# Patient Record
Sex: FEMALE | Race: WHITE | NOT HISPANIC OR LATINO | Employment: FULL TIME | URBAN - METROPOLITAN AREA
[De-identification: names, ages, dates, MRNs, and addresses within clinical notes are randomized per-mention and may not be internally consistent; named-entity substitution may affect disease eponyms.]

---

## 2024-11-22 ENCOUNTER — APPOINTMENT (OUTPATIENT)
Dept: RADIOLOGY | Facility: HOSPITAL | Age: 69
End: 2024-11-22
Payer: COMMERCIAL

## 2024-11-22 ENCOUNTER — HOSPITAL ENCOUNTER (OUTPATIENT)
Facility: HOSPITAL | Age: 69
Setting detail: OBSERVATION
End: 2024-11-22
Attending: EMERGENCY MEDICINE | Admitting: INTERNAL MEDICINE
Payer: COMMERCIAL

## 2024-11-22 DIAGNOSIS — Z74.09 IMPAIRED MOBILITY AND ADLS: ICD-10-CM

## 2024-11-22 DIAGNOSIS — K59.03 DRUG-INDUCED CONSTIPATION: ICD-10-CM

## 2024-11-22 DIAGNOSIS — M54.32 LEFT SIDED SCIATICA: ICD-10-CM

## 2024-11-22 DIAGNOSIS — Z78.9 IMPAIRED MOBILITY AND ADLS: ICD-10-CM

## 2024-11-22 DIAGNOSIS — K21.9 GASTROESOPHAGEAL REFLUX DISEASE WITHOUT ESOPHAGITIS: ICD-10-CM

## 2024-11-22 DIAGNOSIS — M48.00 CENTRAL STENOSIS OF SPINAL CANAL: ICD-10-CM

## 2024-11-22 DIAGNOSIS — M54.42 ACUTE LEFT-SIDED LOW BACK PAIN WITH LEFT-SIDED SCIATICA: Primary | ICD-10-CM

## 2024-11-22 LAB
ALBUMIN SERPL BCP-MCNC: 3.9 G/DL (ref 3.4–5)
ALP SERPL-CCNC: 70 U/L (ref 33–136)
ALT SERPL W P-5'-P-CCNC: 71 U/L (ref 7–45)
ANION GAP SERPL CALC-SCNC: 13 MMOL/L (ref 10–20)
AST SERPL W P-5'-P-CCNC: 52 U/L (ref 9–39)
BASOPHILS # BLD AUTO: 0.05 X10*3/UL (ref 0–0.1)
BASOPHILS NFR BLD AUTO: 0.6 %
BILIRUB SERPL-MCNC: 0.6 MG/DL (ref 0–1.2)
BUN SERPL-MCNC: 17 MG/DL (ref 6–23)
CALCIUM SERPL-MCNC: 8.9 MG/DL (ref 8.6–10.3)
CHLORIDE SERPL-SCNC: 109 MMOL/L (ref 98–107)
CO2 SERPL-SCNC: 27 MMOL/L (ref 21–32)
CREAT SERPL-MCNC: 0.78 MG/DL (ref 0.5–1.05)
EGFRCR SERPLBLD CKD-EPI 2021: 82 ML/MIN/1.73M*2
EOSINOPHIL # BLD AUTO: 0.22 X10*3/UL (ref 0–0.7)
EOSINOPHIL NFR BLD AUTO: 2.6 %
ERYTHROCYTE [DISTWIDTH] IN BLOOD BY AUTOMATED COUNT: 12.4 % (ref 11.5–14.5)
GLUCOSE SERPL-MCNC: 88 MG/DL (ref 74–99)
HCT VFR BLD AUTO: 43.8 % (ref 36–46)
HGB BLD-MCNC: 14.9 G/DL (ref 12–16)
IMM GRANULOCYTES # BLD AUTO: 0.03 X10*3/UL (ref 0–0.7)
IMM GRANULOCYTES NFR BLD AUTO: 0.4 % (ref 0–0.9)
LYMPHOCYTES # BLD AUTO: 1.81 X10*3/UL (ref 1.2–4.8)
LYMPHOCYTES NFR BLD AUTO: 21.8 %
MCH RBC QN AUTO: 30 PG (ref 26–34)
MCHC RBC AUTO-ENTMCNC: 34 G/DL (ref 32–36)
MCV RBC AUTO: 88 FL (ref 80–100)
MONOCYTES # BLD AUTO: 0.63 X10*3/UL (ref 0.1–1)
MONOCYTES NFR BLD AUTO: 7.6 %
NEUTROPHILS # BLD AUTO: 5.58 X10*3/UL (ref 1.2–7.7)
NEUTROPHILS NFR BLD AUTO: 67 %
NRBC BLD-RTO: 0 /100 WBCS (ref 0–0)
PLATELET # BLD AUTO: 209 X10*3/UL (ref 150–450)
POTASSIUM SERPL-SCNC: 3.5 MMOL/L (ref 3.5–5.3)
PROT SERPL-MCNC: 6.7 G/DL (ref 6.4–8.2)
RBC # BLD AUTO: 4.96 X10*6/UL (ref 4–5.2)
SODIUM SERPL-SCNC: 145 MMOL/L (ref 136–145)
WBC # BLD AUTO: 8.3 X10*3/UL (ref 4.4–11.3)

## 2024-11-22 PROCEDURE — 96375 TX/PRO/DX INJ NEW DRUG ADDON: CPT

## 2024-11-22 PROCEDURE — 96372 THER/PROPH/DIAG INJ SC/IM: CPT | Mod: 59 | Performed by: INTERNAL MEDICINE

## 2024-11-22 PROCEDURE — 2500000005 HC RX 250 GENERAL PHARMACY W/O HCPCS

## 2024-11-22 PROCEDURE — 96374 THER/PROPH/DIAG INJ IV PUSH: CPT

## 2024-11-22 PROCEDURE — 72131 CT LUMBAR SPINE W/O DYE: CPT

## 2024-11-22 PROCEDURE — G0378 HOSPITAL OBSERVATION PER HR: HCPCS

## 2024-11-22 PROCEDURE — 2500000001 HC RX 250 WO HCPCS SELF ADMINISTERED DRUGS (ALT 637 FOR MEDICARE OP)

## 2024-11-22 PROCEDURE — 36415 COLL VENOUS BLD VENIPUNCTURE: CPT

## 2024-11-22 PROCEDURE — 99222 1ST HOSP IP/OBS MODERATE 55: CPT | Performed by: INTERNAL MEDICINE

## 2024-11-22 PROCEDURE — 72131 CT LUMBAR SPINE W/O DYE: CPT | Performed by: RADIOLOGY

## 2024-11-22 PROCEDURE — 2500000001 HC RX 250 WO HCPCS SELF ADMINISTERED DRUGS (ALT 637 FOR MEDICARE OP): Performed by: INTERNAL MEDICINE

## 2024-11-22 PROCEDURE — 84075 ASSAY ALKALINE PHOSPHATASE: CPT

## 2024-11-22 PROCEDURE — 72148 MRI LUMBAR SPINE W/O DYE: CPT | Performed by: RADIOLOGY

## 2024-11-22 PROCEDURE — 2500000004 HC RX 250 GENERAL PHARMACY W/ HCPCS (ALT 636 FOR OP/ED): Performed by: INTERNAL MEDICINE

## 2024-11-22 PROCEDURE — 72148 MRI LUMBAR SPINE W/O DYE: CPT

## 2024-11-22 PROCEDURE — 2500000004 HC RX 250 GENERAL PHARMACY W/ HCPCS (ALT 636 FOR OP/ED)

## 2024-11-22 PROCEDURE — 85025 COMPLETE CBC W/AUTO DIFF WBC: CPT

## 2024-11-22 PROCEDURE — 99285 EMERGENCY DEPT VISIT HI MDM: CPT | Mod: 25

## 2024-11-22 RX ORDER — BACLOFEN 10 MG/1
10 TABLET ORAL 2 TIMES DAILY
COMMUNITY
Start: 2024-11-19

## 2024-11-22 RX ORDER — GABAPENTIN 300 MG/1
300 CAPSULE ORAL ONCE
Status: COMPLETED | OUTPATIENT
Start: 2024-11-22 | End: 2024-11-22

## 2024-11-22 RX ORDER — KETOROLAC TROMETHAMINE 15 MG/ML
15 INJECTION, SOLUTION INTRAMUSCULAR; INTRAVENOUS ONCE
Status: COMPLETED | OUTPATIENT
Start: 2024-11-22 | End: 2024-11-22

## 2024-11-22 RX ORDER — OXYCODONE HYDROCHLORIDE 5 MG/1
5 TABLET ORAL ONCE
Status: COMPLETED | OUTPATIENT
Start: 2024-11-22 | End: 2024-11-22

## 2024-11-22 RX ORDER — VALSARTAN AND HYDROCHLOROTHIAZIDE 80; 12.5 MG/1; MG/1
1 TABLET, FILM COATED ORAL
COMMUNITY
Start: 2024-08-19

## 2024-11-22 RX ORDER — VERAPAMIL HYDROCHLORIDE 180 MG/1
180 TABLET, EXTENDED RELEASE ORAL DAILY
Status: DISCONTINUED | OUTPATIENT
Start: 2024-11-23 | End: 2024-11-27 | Stop reason: HOSPADM

## 2024-11-22 RX ORDER — FLUTICASONE PROPIONATE AND SALMETEROL XINAFOATE 45; 21 UG/1; UG/1
2 AEROSOL, METERED RESPIRATORY (INHALATION) DAILY
COMMUNITY
Start: 2024-10-19

## 2024-11-22 RX ORDER — IBUPROFEN 400 MG/1
400 TABLET ORAL EVERY 6 HOURS PRN
COMMUNITY
End: 2024-11-27 | Stop reason: HOSPADM

## 2024-11-22 RX ORDER — HYDROCHLOROTHIAZIDE 25 MG/1
12.5 TABLET ORAL DAILY
COMMUNITY
Start: 2024-10-09 | End: 2024-11-27 | Stop reason: HOSPADM

## 2024-11-22 RX ORDER — PREDNISONE 10 MG/1
10 TABLET ORAL DAILY
Status: DISCONTINUED | OUTPATIENT
Start: 2024-11-29 | End: 2024-11-27 | Stop reason: HOSPADM

## 2024-11-22 RX ORDER — MELOXICAM 15 MG/1
15 TABLET ORAL
COMMUNITY
Start: 2024-10-19

## 2024-11-22 RX ORDER — ALBUTEROL SULFATE 90 UG/1
2 INHALANT RESPIRATORY (INHALATION) EVERY 6 HOURS PRN
COMMUNITY
Start: 2023-04-18

## 2024-11-22 RX ORDER — BACLOFEN 10 MG/1
10 TABLET ORAL 2 TIMES DAILY
Status: DISCONTINUED | OUTPATIENT
Start: 2024-11-22 | End: 2024-11-23

## 2024-11-22 RX ORDER — DEXAMETHASONE SODIUM PHOSPHATE 10 MG/ML
10 INJECTION INTRAMUSCULAR; INTRAVENOUS ONCE
Status: COMPLETED | OUTPATIENT
Start: 2024-11-22 | End: 2024-11-22

## 2024-11-22 RX ORDER — CYCLOBENZAPRINE HCL 10 MG
5 TABLET ORAL ONCE
Status: DISCONTINUED | OUTPATIENT
Start: 2024-11-22 | End: 2024-11-22

## 2024-11-22 RX ORDER — MORPHINE SULFATE 2 MG/ML
2 INJECTION, SOLUTION INTRAMUSCULAR; INTRAVENOUS EVERY 4 HOURS PRN
Status: DISCONTINUED | OUTPATIENT
Start: 2024-11-22 | End: 2024-11-23

## 2024-11-22 RX ORDER — HYDROCHLOROTHIAZIDE 25 MG/1
12.5 TABLET ORAL DAILY
Status: DISCONTINUED | OUTPATIENT
Start: 2024-11-23 | End: 2024-11-27 | Stop reason: HOSPADM

## 2024-11-22 RX ORDER — KETOROLAC TROMETHAMINE 10 MG/1
10 TABLET, FILM COATED ORAL EVERY 8 HOURS PRN
COMMUNITY
Start: 2014-05-19 | End: 2024-11-27 | Stop reason: HOSPADM

## 2024-11-22 RX ORDER — POLYETHYLENE GLYCOL 3350 17 G/17G
17 POWDER, FOR SOLUTION ORAL DAILY PRN
Status: DISCONTINUED | OUTPATIENT
Start: 2024-11-22 | End: 2024-11-27 | Stop reason: HOSPADM

## 2024-11-22 RX ORDER — ENOXAPARIN SODIUM 100 MG/ML
40 INJECTION SUBCUTANEOUS EVERY 12 HOURS SCHEDULED
Status: DISCONTINUED | OUTPATIENT
Start: 2024-11-22 | End: 2024-11-27 | Stop reason: HOSPADM

## 2024-11-22 RX ORDER — FLUTICASONE FUROATE AND VILANTEROL 100; 25 UG/1; UG/1
1 POWDER RESPIRATORY (INHALATION)
Status: DISCONTINUED | OUTPATIENT
Start: 2024-11-23 | End: 2024-11-24 | Stop reason: ALTCHOICE

## 2024-11-22 RX ORDER — SERTRALINE HYDROCHLORIDE 100 MG/1
100 TABLET, FILM COATED ORAL DAILY
COMMUNITY

## 2024-11-22 RX ORDER — MULTIVITAMIN
1 TABLET ORAL DAILY
COMMUNITY

## 2024-11-22 RX ORDER — ACETAMINOPHEN 325 MG/1
650 TABLET ORAL EVERY 6 HOURS PRN
Status: DISCONTINUED | OUTPATIENT
Start: 2024-11-22 | End: 2024-11-23

## 2024-11-22 RX ORDER — LIDOCAINE 560 MG/1
1 PATCH PERCUTANEOUS; TOPICAL; TRANSDERMAL ONCE
Status: COMPLETED | OUTPATIENT
Start: 2024-11-22 | End: 2024-11-22

## 2024-11-22 RX ORDER — VIT C/E/ZN/COPPR/LUTEIN/ZEAXAN 250MG-90MG
25 CAPSULE ORAL DAILY
COMMUNITY

## 2024-11-22 RX ORDER — MELOXICAM 7.5 MG/1
15 TABLET ORAL
Status: DISCONTINUED | OUTPATIENT
Start: 2024-11-23 | End: 2024-11-27 | Stop reason: HOSPADM

## 2024-11-22 RX ORDER — SERTRALINE HYDROCHLORIDE 50 MG/1
100 TABLET, FILM COATED ORAL DAILY
Status: DISCONTINUED | OUTPATIENT
Start: 2024-11-23 | End: 2024-11-27 | Stop reason: HOSPADM

## 2024-11-22 RX ORDER — PREDNISONE 20 MG/1
20 TABLET ORAL DAILY
Status: COMPLETED | OUTPATIENT
Start: 2024-11-23 | End: 2024-11-25

## 2024-11-22 RX ORDER — GABAPENTIN 300 MG/1
300 CAPSULE ORAL 2 TIMES DAILY
Status: DISCONTINUED | OUTPATIENT
Start: 2024-11-23 | End: 2024-11-23

## 2024-11-22 RX ORDER — PREDNISONE 5 MG/1
5 TABLET ORAL DAILY
Status: DISCONTINUED | OUTPATIENT
Start: 2024-12-02 | End: 2024-11-27 | Stop reason: HOSPADM

## 2024-11-22 RX ORDER — CHOLECALCIFEROL (VITAMIN D3) 25 MCG
1000 TABLET ORAL DAILY
Status: DISCONTINUED | OUTPATIENT
Start: 2024-11-23 | End: 2024-11-27 | Stop reason: HOSPADM

## 2024-11-22 RX ORDER — ACETAMINOPHEN 325 MG/1
650 TABLET ORAL EVERY 6 HOURS PRN
COMMUNITY

## 2024-11-22 RX ORDER — NALOXONE HYDROCHLORIDE 0.4 MG/ML
0.2 INJECTION, SOLUTION INTRAMUSCULAR; INTRAVENOUS; SUBCUTANEOUS EVERY 5 MIN PRN
Status: DISCONTINUED | OUTPATIENT
Start: 2024-11-22 | End: 2024-11-27 | Stop reason: HOSPADM

## 2024-11-22 RX ORDER — LORAZEPAM 1 MG/1
1 TABLET ORAL ONCE
Status: COMPLETED | OUTPATIENT
Start: 2024-11-22 | End: 2024-11-22

## 2024-11-22 RX ORDER — ORPHENADRINE CITRATE 30 MG/ML
60 INJECTION INTRAMUSCULAR; INTRAVENOUS ONCE
Status: COMPLETED | OUTPATIENT
Start: 2024-11-22 | End: 2024-11-22

## 2024-11-22 RX ORDER — GABAPENTIN 300 MG/1
300 CAPSULE ORAL 2 TIMES DAILY
COMMUNITY
Start: 2024-11-20 | End: 2024-12-05

## 2024-11-22 RX ORDER — VERAPAMIL HYDROCHLORIDE 180 MG/1
1 CAPSULE, EXTENDED RELEASE ORAL DAILY
COMMUNITY
Start: 2024-06-17

## 2024-11-22 RX ORDER — ALBUTEROL SULFATE 90 UG/1
2 INHALANT RESPIRATORY (INHALATION) EVERY 6 HOURS PRN
Status: DISCONTINUED | OUTPATIENT
Start: 2024-11-22 | End: 2024-11-23

## 2024-11-22 RX ORDER — MORPHINE SULFATE 2 MG/ML
1 INJECTION, SOLUTION INTRAMUSCULAR; INTRAVENOUS EVERY 4 HOURS PRN
Status: DISCONTINUED | OUTPATIENT
Start: 2024-11-22 | End: 2024-11-23

## 2024-11-22 SDOH — ECONOMIC STABILITY: FOOD INSECURITY: WITHIN THE PAST 12 MONTHS, YOU WORRIED THAT YOUR FOOD WOULD RUN OUT BEFORE YOU GOT THE MONEY TO BUY MORE.: NEVER TRUE

## 2024-11-22 SDOH — SOCIAL STABILITY: SOCIAL INSECURITY: WERE YOU ABLE TO COMPLETE ALL THE BEHAVIORAL HEALTH SCREENINGS?: YES

## 2024-11-22 SDOH — SOCIAL STABILITY: SOCIAL INSECURITY: WITHIN THE LAST YEAR, HAVE YOU BEEN AFRAID OF YOUR PARTNER OR EX-PARTNER?: NO

## 2024-11-22 SDOH — SOCIAL STABILITY: SOCIAL INSECURITY
WITHIN THE LAST YEAR, HAVE YOU BEEN KICKED, HIT, SLAPPED, OR OTHERWISE PHYSICALLY HURT BY YOUR PARTNER OR EX-PARTNER?: NO

## 2024-11-22 SDOH — SOCIAL STABILITY: SOCIAL INSECURITY
WITHIN THE LAST YEAR, HAVE YOU BEEN RAPED OR FORCED TO HAVE ANY KIND OF SEXUAL ACTIVITY BY YOUR PARTNER OR EX-PARTNER?: NO

## 2024-11-22 SDOH — ECONOMIC STABILITY: FOOD INSECURITY: WITHIN THE PAST 12 MONTHS, THE FOOD YOU BOUGHT JUST DIDN'T LAST AND YOU DIDN'T HAVE MONEY TO GET MORE.: NEVER TRUE

## 2024-11-22 SDOH — ECONOMIC STABILITY: INCOME INSECURITY: IN THE PAST 12 MONTHS HAS THE ELECTRIC, GAS, OIL, OR WATER COMPANY THREATENED TO SHUT OFF SERVICES IN YOUR HOME?: NO

## 2024-11-22 SDOH — SOCIAL STABILITY: SOCIAL INSECURITY: DOES ANYONE TRY TO KEEP YOU FROM HAVING/CONTACTING OTHER FRIENDS OR DOING THINGS OUTSIDE YOUR HOME?: NO

## 2024-11-22 SDOH — SOCIAL STABILITY: SOCIAL INSECURITY: HAVE YOU HAD THOUGHTS OF HARMING ANYONE ELSE?: NO

## 2024-11-22 SDOH — SOCIAL STABILITY: SOCIAL INSECURITY: ARE YOU OR HAVE YOU BEEN THREATENED OR ABUSED PHYSICALLY, EMOTIONALLY, OR SEXUALLY BY ANYONE?: NO

## 2024-11-22 SDOH — SOCIAL STABILITY: SOCIAL INSECURITY: HAVE YOU HAD ANY THOUGHTS OF HARMING ANYONE ELSE?: NO

## 2024-11-22 SDOH — SOCIAL STABILITY: SOCIAL INSECURITY: DO YOU FEEL ANYONE HAS EXPLOITED OR TAKEN ADVANTAGE OF YOU FINANCIALLY OR OF YOUR PERSONAL PROPERTY?: NO

## 2024-11-22 SDOH — SOCIAL STABILITY: SOCIAL INSECURITY: WITHIN THE LAST YEAR, HAVE YOU BEEN HUMILIATED OR EMOTIONALLY ABUSED IN OTHER WAYS BY YOUR PARTNER OR EX-PARTNER?: NO

## 2024-11-22 SDOH — SOCIAL STABILITY: SOCIAL INSECURITY: DO YOU FEEL UNSAFE GOING BACK TO THE PLACE WHERE YOU ARE LIVING?: NO

## 2024-11-22 SDOH — SOCIAL STABILITY: SOCIAL INSECURITY: HAS ANYONE EVER THREATENED TO HURT YOUR FAMILY OR YOUR PETS?: NO

## 2024-11-22 SDOH — SOCIAL STABILITY: SOCIAL INSECURITY: ARE THERE ANY APPARENT SIGNS OF INJURIES/BEHAVIORS THAT COULD BE RELATED TO ABUSE/NEGLECT?: NO

## 2024-11-22 SDOH — SOCIAL STABILITY: SOCIAL INSECURITY: ABUSE: ADULT

## 2024-11-22 ASSESSMENT — LIFESTYLE VARIABLES
HOW OFTEN DO YOU HAVE 6 OR MORE DRINKS ON ONE OCCASION: NEVER
HAVE YOU EVER FELT YOU SHOULD CUT DOWN ON YOUR DRINKING: NO
HOW OFTEN DO YOU HAVE A DRINK CONTAINING ALCOHOL: MONTHLY OR LESS
EVER FELT BAD OR GUILTY ABOUT YOUR DRINKING: NO
TOTAL SCORE: 0
HOW MANY STANDARD DRINKS CONTAINING ALCOHOL DO YOU HAVE ON A TYPICAL DAY: 1 OR 2
EVER HAD A DRINK FIRST THING IN THE MORNING TO STEADY YOUR NERVES TO GET RID OF A HANGOVER: NO
AUDIT-C TOTAL SCORE: 1
HAVE PEOPLE ANNOYED YOU BY CRITICIZING YOUR DRINKING: NO
SKIP TO QUESTIONS 9-10: 1
AUDIT-C TOTAL SCORE: 1

## 2024-11-22 ASSESSMENT — COGNITIVE AND FUNCTIONAL STATUS - GENERAL
MOVING TO AND FROM BED TO CHAIR: A LOT
MOBILITY SCORE: 15
DRESSING REGULAR LOWER BODY CLOTHING: A LITTLE
DRESSING REGULAR UPPER BODY CLOTHING: A LITTLE
CLIMB 3 TO 5 STEPS WITH RAILING: A LOT
TURNING FROM BACK TO SIDE WHILE IN FLAT BAD: A LITTLE
WALKING IN HOSPITAL ROOM: A LOT
HELP NEEDED FOR BATHING: A LITTLE
TOILETING: A LITTLE
STANDING UP FROM CHAIR USING ARMS: A LOT
PATIENT BASELINE BEDBOUND: NO
DAILY ACTIVITIY SCORE: 20

## 2024-11-22 ASSESSMENT — PAIN - FUNCTIONAL ASSESSMENT
PAIN_FUNCTIONAL_ASSESSMENT: 0-10
PAIN_FUNCTIONAL_ASSESSMENT: 0-10

## 2024-11-22 ASSESSMENT — PATIENT HEALTH QUESTIONNAIRE - PHQ9
SUM OF ALL RESPONSES TO PHQ9 QUESTIONS 1 & 2: 0
2. FEELING DOWN, DEPRESSED OR HOPELESS: NOT AT ALL
1. LITTLE INTEREST OR PLEASURE IN DOING THINGS: NOT AT ALL

## 2024-11-22 ASSESSMENT — ACTIVITIES OF DAILY LIVING (ADL)
HEARING - LEFT EAR: FUNCTIONAL
ASSISTIVE_DEVICE: EYEGLASSES;WALKER
LACK_OF_TRANSPORTATION: NO
DRESSING YOURSELF: NEEDS ASSISTANCE
WALKS IN HOME: NEEDS ASSISTANCE
ADEQUATE_TO_COMPLETE_ADL: YES
GROOMING: NEEDS ASSISTANCE
FEEDING YOURSELF: INDEPENDENT
JUDGMENT_ADEQUATE_SAFELY_COMPLETE_DAILY_ACTIVITIES: YES
TOILETING: NEEDS ASSISTANCE
HEARING - RIGHT EAR: FUNCTIONAL
BATHING: NEEDS ASSISTANCE
PATIENT'S MEMORY ADEQUATE TO SAFELY COMPLETE DAILY ACTIVITIES?: YES

## 2024-11-22 ASSESSMENT — COLUMBIA-SUICIDE SEVERITY RATING SCALE - C-SSRS
6. HAVE YOU EVER DONE ANYTHING, STARTED TO DO ANYTHING, OR PREPARED TO DO ANYTHING TO END YOUR LIFE?: NO
2. HAVE YOU ACTUALLY HAD ANY THOUGHTS OF KILLING YOURSELF?: NO
1. IN THE PAST MONTH, HAVE YOU WISHED YOU WERE DEAD OR WISHED YOU COULD GO TO SLEEP AND NOT WAKE UP?: NO
6. HAVE YOU EVER DONE ANYTHING, STARTED TO DO ANYTHING, OR PREPARED TO DO ANYTHING TO END YOUR LIFE?: NO
1. IN THE PAST MONTH, HAVE YOU WISHED YOU WERE DEAD OR WISHED YOU COULD GO TO SLEEP AND NOT WAKE UP?: NO

## 2024-11-22 ASSESSMENT — PAIN SCALES - GENERAL
PAINLEVEL_OUTOF10: 7
PAINLEVEL_OUTOF10: 0 - NO PAIN
PAINLEVEL_OUTOF10: 0 - NO PAIN

## 2024-11-22 ASSESSMENT — PAIN DESCRIPTION - LOCATION: LOCATION: ABDOMEN

## 2024-11-22 NOTE — ED PROVIDER NOTES
CC: Back Pain     History provided by: Patient and Family Member  Limitations to History: None    HPI:  Patient is a 69-year-old female with history of lower back pain.  Patient states she has a history of a herniated disc in her lumbar spine and earlier this week she bent down to pick something up from the ground and since then has had progressively worsening lower back pain.  She states she called her PCP and was given diclofenac and gabapentin to take 3 days ago which has not been improving her symptoms.  She states this morning she felt twice when getting off of the toilet, did not hit her head, not on blood thinners, fell onto her bottom during these events.  She denies any worsening pain since the falls.  She states most of the pain is in the left side of her back and shoots into her left lower extremity up to the knee.  She denies any saddle anesthesia, urinary retention, urinary incontinence, bowel bladder changes, abdominal pain, syncopal episodes, fevers, chills, history of malignancy, weakness, numbness, tingling.  She states she has difficulty walking now due to pain.  She denies any chest pain, shortness of breath, prodrome prior to the fall.    External Records Reviewed:  I reviewed prior ED visits, Care Everywhere, discharge summaries and outpatient records as appropriate.   ???????????????????????????????????????????????????????????????  Triage Vitals:  T 36.5 °C (97.7 °F)  HR 62  /78  RR 18  O2 98 %      Physical Exam  Vitals and nursing note reviewed.   Constitutional:       General: She is not in acute distress.     Appearance: Normal appearance.   HENT:      Head: Normocephalic and atraumatic.   Eyes:      Conjunctiva/sclera: Conjunctivae normal.   Cardiovascular:      Rate and Rhythm: Normal rate and regular rhythm.   Pulmonary:      Effort: Pulmonary effort is normal. No respiratory distress.      Breath sounds: Normal breath sounds.   Abdominal:      General: Abdomen is flat. There  is no distension.      Palpations: Abdomen is soft.      Tenderness: There is no abdominal tenderness. There is no guarding or rebound.   Musculoskeletal:      Cervical back: Normal range of motion and neck supple.      Comments: No midline C, T, L-spine tenderness to palpation, no appreciable step-offs, 5 out of 5 strength in bilateral right lower extremity, patient is unable to lift left lower extremity off of the bed, is able to flex at the knee, negative logroll bilaterally, sensation grossly intact and symmetric in both lower extremities, no saddle anesthesia, 4-5 strength in left foot with dorsiflexion compared to right lower extremity, overall limited range of motion of left lower extremity   Skin:     General: Skin is warm and dry.   Neurological:      General: No focal deficit present.      Mental Status: She is alert and oriented to person, place, and time. Mental status is at baseline.   Psychiatric:         Mood and Affect: Mood normal.         Behavior: Behavior normal.        ???????????????????????????????????????????????????????????????  ED Course/Treatment/Medical Decision Making  MDM:  Patient is a 69-year-old female who presents with lower back pain.  Vital signs are stable.  No obvious traumatic injury on examination.  She has no midline spinal tenderness to palpation, decreased strength and movement in left lower extremity likely secondary to pain.  I did obtain CT of lumbar spine to further evaluate for spinal stenosis.  Differential diagnoses considered include radiculopathy, myelopathy, cord compression, cauda equina, spinal infection, osteomyelitis, vertebral fracture, sciatica, disk herniation.  In the absence of saddle anesthesia, sensory deficits, low suspicion for acute cauda equina.  Patient given IV analgesics.  I did discuss possibility of admission if we are unable to control patient's pain and she has limitation of activities of daily living/ambulating and she is agreeable.      ED  Course:  ED Course as of 11/22/24 2037 Fri Nov 22, 2024   1411 CBC, CMP overall wnl, slightly elevated AST/ALT appears nonspecific, no abd tenderness on exam [SA]   1456 CT reviewed and discussed with patient:  FINDINGS:  OSSEOUS STRUCTURES:  There is no evidence of acute fracture identified. The vertebral  bodies are well aligned without evidence of subluxation.      Moderate disc space narrowing and small to moderate marginal  osteophytes are present at the L2-3 level. Mild-to-moderate facet  degenerative changes are seen throughout the mid to lower lumbar  spine.      At least moderate to severe central canal narrowing is seen at the  L3-4 and L4-5 levels   [SA]   1507 I reevaluated patient after CT imaging results and she notes continued pain and inability to ambulate, multimodal pain regimen given with IV Decadron, Norflex and lidocaine patch [SA]   1544 Ortho spine referral sent [SA]   1554 Discussed with hospitalist for admission [SA]   1633 After discussion with hospitalist, he recommends discussion with neurosurgery team due to severe spinal Canal stenosis.  Neurosurgery contacted [SA]   1653 NSGY recommends MRI L spine without contrast to evaluate for disk herniation which I ordered. Ativan given prior to MRI for claustrophobia [SA]   2034 MRI reviewed:  IMPRESSION:  1. Degenerative changes of the lumbar spine as described above with  left-sided foraminal protrusions at L2-3 and L3-4 resulting in severe  left L2-3 and moderate left L3-4 foraminal stenosis. Mild spinal  canal stenosis from L2-3 to L4-5. Multilevel bilateral lateral recess  stenosis as above.   [SA]   2035 Re-discussed with hospitalist for admission [SA]      ED Course User Index  [SA] Sade Huitron DO         Diagnoses as of 11/22/24 2037   Acute left-sided low back pain with left-sided sciatica   Central stenosis of spinal canal         EKG Interpretation:  See ED Course/Below:    Independent Interpretation of Studies:  I  independently interpreted labs/imaging as stated in ED Course or below.    Differential diagnoses considered include but are not limited to: See MDM/Below:    Social Determinants Limiting Care:  None identified The following actions were taken to address these social determinants:      Discussion of Management with Other Providers: See MDM/Below:    Disposition:  Admitted    Sade Huitron, EM, PGY-3    I reviewed the case with the attending ED physician. The attending ED physician agrees with the plan. Patient and/or patient´s representative was counseled regarding labs, imaging, likely diagnosis, and plan. All questions were answered.    Disclaimer: This note was dictated by speech recognition.  Attempt at proofreading was made to minimize errors.  Errors in transcription may be present.  Please call if questions.    Procedures ? SmartLinks last updated 11/22/2024 8:37 PM          Sade Huitron, DO  Resident  11/22/24 1851       Sade Huitron, DO  Resident  11/22/24 2037

## 2024-11-22 NOTE — ED PROVIDER NOTES
The patient was seen by the midlevel/resident.  I have personally saw the patient and made/approved the management plan and take responsibility for the patient management.  I reviewed the EKG's (when done) and agree with the interpretation.  I have seen and examined the patient; agree with the workup, evaluation, MDM, and diagnosis.  The care plan has been discussed with the midlevel/resident; I have reviewed the note and agree with the documented findings.       Presents with complaint of back pain and pain on the left side going down her leg.  She has a history of back pain in the past.  She bent over a few days ago and has been getting worse since then.  No bowel bladder incontinence.  She has been having difficulty getting around at home.  Clinically doubt cauda equina syndrome.  She is presenting with acute on chronic discomfort but unable to get around.  We tried some IV narcotic pain medicine and some adjuvants IV as well.  She is still quite uncomfortable.  We will reevaluate if she cannot get around or ambulating me require hospitalization possible placement.  Spoke to patient daughter at bedside.     Patient will require hospitalization.  Spoke to the hospitalist who requested we get neurosurgery involved.  Spoke to neurosurgery requested MRI.  Patient has significant claustrophobia was given some antianxiety medication.  Endorsed to oncoming physician at 1800 awaiting imaging.  I suspect patient will require hospitalization after MRI results come back.  ED Course as of 11/23/24 0552   Fri Nov 22, 2024   1411 CBC, CMP overall wnl, slightly elevated AST/ALT appears nonspecific, no abd tenderness on exam [SA]   1456 CT reviewed and discussed with patient:  FINDINGS:  OSSEOUS STRUCTURES:  There is no evidence of acute fracture identified. The vertebral  bodies are well aligned without evidence of subluxation.      Moderate disc space narrowing and small to moderate marginal  osteophytes are present at the  L2-3 level. Mild-to-moderate facet  degenerative changes are seen throughout the mid to lower lumbar  spine.      At least moderate to severe central canal narrowing is seen at the  L3-4 and L4-5 levels   [SA]   1507 I reevaluated patient after CT imaging results and she notes continued pain and inability to ambulate, multimodal pain regimen given with IV Decadron, Norflex and lidocaine patch [SA]   1544 Ortho spine referral sent [SA]   1554 Discussed with hospitalist for admission [SA]   1633 After discussion with hospitalist, he recommends discussion with neurosurgery team due to severe spinal Canal stenosis.  Neurosurgery contacted [SA]   1653 NSGY recommends MRI L spine without contrast to evaluate for disk herniation which I ordered. Ativan given prior to MRI for claustrophobia [SA]   2034 MRI reviewed:  IMPRESSION:  1. Degenerative changes of the lumbar spine as described above with  left-sided foraminal protrusions at L2-3 and L3-4 resulting in severe  left L2-3 and moderate left L3-4 foraminal stenosis. Mild spinal  canal stenosis from L2-3 to L4-5. Multilevel bilateral lateral recess  stenosis as above.   [SA]   2035 Re-discussed with hospitalist for admission [SA]      ED Course User Index  [SA] Sade Huitron, DO         Diagnoses as of 11/23/24 0552   Acute left-sided low back pain with left-sided sciatica   Central stenosis of spinal canal     MD Ketan Pritchard MD  11/22/24 1712       Ketan Marcus MD  11/23/24 0552

## 2024-11-23 PROBLEM — M54.32 LEFT SIDED SCIATICA: Status: ACTIVE | Noted: 2024-11-23

## 2024-11-23 LAB
ANION GAP SERPL CALC-SCNC: 13 MMOL/L (ref 10–20)
BUN SERPL-MCNC: 17 MG/DL (ref 6–23)
CALCIUM SERPL-MCNC: 9.5 MG/DL (ref 8.6–10.3)
CHLORIDE SERPL-SCNC: 107 MMOL/L (ref 98–107)
CO2 SERPL-SCNC: 28 MMOL/L (ref 21–32)
CREAT SERPL-MCNC: 0.78 MG/DL (ref 0.5–1.05)
EGFRCR SERPLBLD CKD-EPI 2021: 82 ML/MIN/1.73M*2
ERYTHROCYTE [DISTWIDTH] IN BLOOD BY AUTOMATED COUNT: 12.3 % (ref 11.5–14.5)
GLUCOSE SERPL-MCNC: 119 MG/DL (ref 74–99)
HCT VFR BLD AUTO: 45.6 % (ref 36–46)
HGB BLD-MCNC: 15.5 G/DL (ref 12–16)
MCH RBC QN AUTO: 30.1 PG (ref 26–34)
MCHC RBC AUTO-ENTMCNC: 34 G/DL (ref 32–36)
MCV RBC AUTO: 89 FL (ref 80–100)
NRBC BLD-RTO: 0 /100 WBCS (ref 0–0)
PLATELET # BLD AUTO: 209 X10*3/UL (ref 150–450)
POTASSIUM SERPL-SCNC: 4.7 MMOL/L (ref 3.5–5.3)
RBC # BLD AUTO: 5.15 X10*6/UL (ref 4–5.2)
SODIUM SERPL-SCNC: 143 MMOL/L (ref 136–145)
WBC # BLD AUTO: 7.8 X10*3/UL (ref 4.4–11.3)

## 2024-11-23 PROCEDURE — 96376 TX/PRO/DX INJ SAME DRUG ADON: CPT

## 2024-11-23 PROCEDURE — 96372 THER/PROPH/DIAG INJ SC/IM: CPT | Performed by: INTERNAL MEDICINE

## 2024-11-23 PROCEDURE — 97161 PT EVAL LOW COMPLEX 20 MIN: CPT | Mod: GP

## 2024-11-23 PROCEDURE — 2500000002 HC RX 250 W HCPCS SELF ADMINISTERED DRUGS (ALT 637 FOR MEDICARE OP, ALT 636 FOR OP/ED): Performed by: INTERNAL MEDICINE

## 2024-11-23 PROCEDURE — 97165 OT EVAL LOW COMPLEX 30 MIN: CPT | Mod: GO

## 2024-11-23 PROCEDURE — 85027 COMPLETE CBC AUTOMATED: CPT | Performed by: INTERNAL MEDICINE

## 2024-11-23 PROCEDURE — 82374 ASSAY BLOOD CARBON DIOXIDE: CPT | Performed by: INTERNAL MEDICINE

## 2024-11-23 PROCEDURE — 99232 SBSQ HOSP IP/OBS MODERATE 35: CPT | Performed by: INTERNAL MEDICINE

## 2024-11-23 PROCEDURE — 2500000004 HC RX 250 GENERAL PHARMACY W/ HCPCS (ALT 636 FOR OP/ED): Performed by: INTERNAL MEDICINE

## 2024-11-23 PROCEDURE — G0378 HOSPITAL OBSERVATION PER HR: HCPCS

## 2024-11-23 PROCEDURE — 2500000001 HC RX 250 WO HCPCS SELF ADMINISTERED DRUGS (ALT 637 FOR MEDICARE OP): Performed by: INTERNAL MEDICINE

## 2024-11-23 PROCEDURE — 94760 N-INVAS EAR/PLS OXIMETRY 1: CPT

## 2024-11-23 PROCEDURE — 96375 TX/PRO/DX INJ NEW DRUG ADDON: CPT

## 2024-11-23 PROCEDURE — 36415 COLL VENOUS BLD VENIPUNCTURE: CPT | Performed by: INTERNAL MEDICINE

## 2024-11-23 RX ORDER — OXYCODONE HYDROCHLORIDE 5 MG/1
5 TABLET ORAL EVERY 6 HOURS PRN
Status: DISCONTINUED | OUTPATIENT
Start: 2024-11-23 | End: 2024-11-24

## 2024-11-23 RX ORDER — PANTOPRAZOLE SODIUM 40 MG/1
40 TABLET, DELAYED RELEASE ORAL
Status: DISCONTINUED | OUTPATIENT
Start: 2024-11-23 | End: 2024-11-27 | Stop reason: HOSPADM

## 2024-11-23 RX ORDER — IPRATROPIUM BROMIDE AND ALBUTEROL SULFATE 2.5; .5 MG/3ML; MG/3ML
3 SOLUTION RESPIRATORY (INHALATION) EVERY 2 HOUR PRN
Status: DISCONTINUED | OUTPATIENT
Start: 2024-11-23 | End: 2024-11-27 | Stop reason: HOSPADM

## 2024-11-23 RX ORDER — AMOXICILLIN 250 MG
2 CAPSULE ORAL 2 TIMES DAILY
Status: DISCONTINUED | OUTPATIENT
Start: 2024-11-23 | End: 2024-11-27 | Stop reason: HOSPADM

## 2024-11-23 RX ORDER — OXYCODONE HYDROCHLORIDE 5 MG/1
2.5 TABLET ORAL EVERY 4 HOURS PRN
Status: DISCONTINUED | OUTPATIENT
Start: 2024-11-23 | End: 2024-11-23

## 2024-11-23 RX ORDER — OXYCODONE HYDROCHLORIDE 5 MG/1
10 TABLET ORAL EVERY 6 HOURS PRN
Status: DISCONTINUED | OUTPATIENT
Start: 2024-11-23 | End: 2024-11-24

## 2024-11-23 RX ORDER — OXYCODONE HYDROCHLORIDE 5 MG/1
5 TABLET ORAL EVERY 4 HOURS PRN
Status: DISCONTINUED | OUTPATIENT
Start: 2024-11-23 | End: 2024-11-23

## 2024-11-23 RX ORDER — BACLOFEN 10 MG/1
10 TABLET ORAL 3 TIMES DAILY
Status: DISCONTINUED | OUTPATIENT
Start: 2024-11-23 | End: 2024-11-27 | Stop reason: HOSPADM

## 2024-11-23 RX ORDER — POTASSIUM CHLORIDE 20 MEQ/1
40 TABLET, EXTENDED RELEASE ORAL ONCE
Status: COMPLETED | OUTPATIENT
Start: 2024-11-23 | End: 2024-11-23

## 2024-11-23 RX ORDER — POLYETHYLENE GLYCOL 3350 17 G/17G
17 POWDER, FOR SOLUTION ORAL 2 TIMES DAILY
Status: DISCONTINUED | OUTPATIENT
Start: 2024-11-23 | End: 2024-11-27 | Stop reason: HOSPADM

## 2024-11-23 RX ORDER — ACETAMINOPHEN 325 MG/1
975 TABLET ORAL EVERY 8 HOURS
Status: DISCONTINUED | OUTPATIENT
Start: 2024-11-23 | End: 2024-11-27 | Stop reason: HOSPADM

## 2024-11-23 RX ORDER — GABAPENTIN 300 MG/1
300 CAPSULE ORAL 3 TIMES DAILY
Status: DISCONTINUED | OUTPATIENT
Start: 2024-11-23 | End: 2024-11-27 | Stop reason: HOSPADM

## 2024-11-23 RX ORDER — IPRATROPIUM BROMIDE AND ALBUTEROL SULFATE 2.5; .5 MG/3ML; MG/3ML
3 SOLUTION RESPIRATORY (INHALATION) EVERY 4 HOURS PRN
Status: DISCONTINUED | OUTPATIENT
Start: 2024-11-23 | End: 2024-11-23

## 2024-11-23 RX ORDER — MORPHINE SULFATE 2 MG/ML
2 INJECTION, SOLUTION INTRAMUSCULAR; INTRAVENOUS EVERY 4 HOURS PRN
Status: DISCONTINUED | OUTPATIENT
Start: 2024-11-23 | End: 2024-11-27 | Stop reason: HOSPADM

## 2024-11-23 ASSESSMENT — COGNITIVE AND FUNCTIONAL STATUS - GENERAL
HELP NEEDED FOR BATHING: A LITTLE
WALKING IN HOSPITAL ROOM: A LOT
DRESSING REGULAR UPPER BODY CLOTHING: A LITTLE
MOBILITY SCORE: 15
MOVING FROM LYING ON BACK TO SITTING ON SIDE OF FLAT BED WITH BEDRAILS: A LITTLE
MOVING TO AND FROM BED TO CHAIR: A LOT
CLIMB 3 TO 5 STEPS WITH RAILING: A LOT
TOILETING: A LITTLE
CLIMB 3 TO 5 STEPS WITH RAILING: A LOT
TURNING FROM BACK TO SIDE WHILE IN FLAT BAD: A LITTLE
HELP NEEDED FOR BATHING: A LITTLE
STANDING UP FROM CHAIR USING ARMS: TOTAL
DRESSING REGULAR UPPER BODY CLOTHING: A LITTLE
MOBILITY SCORE: 15
TOILETING: A LITTLE
WALKING IN HOSPITAL ROOM: A LOT
DAILY ACTIVITIY SCORE: 16
MOBILITY SCORE: 11
DAILY ACTIVITIY SCORE: 20
TURNING FROM BACK TO SIDE WHILE IN FLAT BAD: A LITTLE
TOILETING: A LOT
DAILY ACTIVITIY SCORE: 20
WALKING IN HOSPITAL ROOM: TOTAL
MOVING TO AND FROM BED TO CHAIR: A LOT
PERSONAL GROOMING: A LITTLE
DRESSING REGULAR LOWER BODY CLOTHING: A LOT
TURNING FROM BACK TO SIDE WHILE IN FLAT BAD: A LITTLE
CLIMB 3 TO 5 STEPS WITH RAILING: TOTAL
HELP NEEDED FOR BATHING: A LOT
DRESSING REGULAR LOWER BODY CLOTHING: A LITTLE
DRESSING REGULAR LOWER BODY CLOTHING: A LITTLE
DRESSING REGULAR UPPER BODY CLOTHING: A LITTLE
MOVING TO AND FROM BED TO CHAIR: A LOT
STANDING UP FROM CHAIR USING ARMS: A LOT
STANDING UP FROM CHAIR USING ARMS: A LOT

## 2024-11-23 ASSESSMENT — PAIN SCALES - GENERAL
PAINLEVEL_OUTOF10: 8
PAINLEVEL_OUTOF10: 4
PAINLEVEL_OUTOF10: 8
PAINLEVEL_OUTOF10: 2
PAINLEVEL_OUTOF10: 8
PAINLEVEL_OUTOF10: 6
PAINLEVEL_OUTOF10: 3

## 2024-11-23 ASSESSMENT — PAIN - FUNCTIONAL ASSESSMENT
PAIN_FUNCTIONAL_ASSESSMENT: 0-10

## 2024-11-23 ASSESSMENT — ACTIVITIES OF DAILY LIVING (ADL)
BATHING_ASSISTANCE: MODERATE
ADL_ASSISTANCE: INDEPENDENT

## 2024-11-23 ASSESSMENT — PAIN DESCRIPTION - LOCATION: LOCATION: LEG

## 2024-11-23 ASSESSMENT — PAIN DESCRIPTION - ORIENTATION: ORIENTATION: LEFT

## 2024-11-23 NOTE — PROGRESS NOTES
11/23/24 1152   Discharge Planning   Living Arrangements Alone   Support Systems Children;Family members   Assistance Needed A&Ox4, independent with ADLs, normally uses no DME but over the past week she has been utilizing a walker, room air at baseline, drives. PCP Dr. Ivette Edmonds ([facility]).   Type of Residence Private residence   Number of Stairs to Enter Residence 3   Number of Stairs Within Residence 0   Do you have animals or pets at home? Yes   Type of Animals or Pets 1 cat, Tita   Home or Post Acute Services None   Expected Discharge Disposition Home   Does the patient need discharge transport arranged? No   Intensity of Service   Intensity of Service 0-30 min

## 2024-11-23 NOTE — H&P
History Of Present Illness  Abiola Bailey is a 69 y.o. female with a past medical history of chronic back pain, herniated disc, spinal stenosis, left-sided sciatica, hypertension, hiatal hernia, migraine, mixed hyperlipidemia, mitral valve disorder, complex tear of medial meniscus hepatic cyst, renal cyst, thyroid nodule, asthma and uterine fibroids who was admitted to the hospital for severe back pain with trouble ambulating.  Patient is known to have chronic back pain as mentioned above.  She bent down to  something from the ground earlier this week and since that time she has been having progressively worsening lower back pain.  She contacted her primary care physician on Monday and was prescribed gabapentin and baclofen because they have worked well for her in the past.  However she was not getting any relief.  She states that the pain worsened to the point where she could not walk.  The pain is usually in the back as well as in the left thigh.  She reported no recent trauma, or injury except for this morning when she fell twice and landed on her bottom while getting out of the toilet.  She did not hit her head or pass out.  She reported no paresthesia, saddle anesthesia, bowel or bladder incontinence.  She states that her last bowel movement was on Wednesday.  She also states that since the pain started she has not have much appetite.  She denies fever, chills, chest pain, difficulty breathing, headache, dizziness, or skin rash.     Past Medical History  She has a past medical history of Other conditions influencing health status (08/29/2014), Personal history of other diseases of the musculoskeletal system and connective tissue (02/17/2014), Personal history of other diseases of the musculoskeletal system and connective tissue, and Personal history of other diseases of the respiratory system (07/24/2015).    Surgical History  She has a past surgical history that includes Total abdominal hysterectomy w/  bilateral salpingoophorectomy (09/17/2015); Tubal ligation (06/01/2015); Appendectomy (06/01/2015); Other surgical history (06/01/2015); and Dilation and curettage of uterus (06/01/2015).     Social History  She reports that she has never smoked. She has never used smokeless tobacco. No history on file for alcohol use and drug use.    Family History  No family history on file.     Allergies  Ciprofloxacin    Medications  Scheduled medications  baclofen, 10 mg, oral, BID  cholecalciferol, 1,000 Units, oral, Daily  enoxaparin, 40 mg, subcutaneous, q12h SKYLER  fluticasone furoate-vilanteroL, 1 puff, inhalation, Daily  gabapentin, 300 mg, oral, BID  hydroCHLOROthiazide, 12.5 mg, oral, Daily  meloxicam, 15 mg, oral, Daily  predniSONE, 20 mg, oral, Daily   Followed by  [START ON 11/26/2024] predniSONE, 15 mg, oral, Daily   Followed by  [START ON 11/29/2024] predniSONE, 10 mg, oral, Daily   Followed by  [START ON 12/2/2024] predniSONE, 5 mg, oral, Daily  sertraline, 100 mg, oral, Daily  valsartan 80 mg, hydroCHLOROthiazide 12.5 mg for Diovan HCT, , oral, Daily  verapamil SR, 180 mg, oral, Daily      Continuous medications     PRN medications  PRN medications: acetaminophen, albuterol, morphine, morphine, naloxone, polyethylene glycol    Review of systems: 10-point review of systems is negative.     Physical Exam  Constitutional: alert and oriented x 3, awake, cooperative, no acute distress  Skin: warm and dry  Head/Neck: Normocephalic, atraumatic  Eyes: clear sclera  ENMT: mucous membranes moist  Cardio: Regular rate and rhythm  Resp: CTA bilaterally, good respiratory effort  Gastrointestinal: Soft, nontender, nondistended, bowel sounds are present  Musculoskeletal: Somewhat limited range of motion in the left lower extremity due to back pain/left thigh pain.  Extremities: No edema, cyanosis, or clubbing  Neuro: lert and oriented x 3, sensation is intact.  Patient moves all limbs against resistance except the left lower  extremity.  She reports pains in the lower back when she tries to lift the left leg off the bed.  Psychological: Appropriate mood and behavior     Last Recorded Vitals  /80   Pulse 60   Temp 35.9 °C (96.6 °F) (Temporal)   Resp 18   Wt 104 kg (230 lb)   SpO2 93%     Relevant Results  Results for orders placed or performed during the hospital encounter of 11/22/24 (from the past 24 hours)   CBC and Auto Differential   Result Value Ref Range    WBC 8.3 4.4 - 11.3 x10*3/uL    nRBC 0.0 0.0 - 0.0 /100 WBCs    RBC 4.96 4.00 - 5.20 x10*6/uL    Hemoglobin 14.9 12.0 - 16.0 g/dL    Hematocrit 43.8 36.0 - 46.0 %    MCV 88 80 - 100 fL    MCH 30.0 26.0 - 34.0 pg    MCHC 34.0 32.0 - 36.0 g/dL    RDW 12.4 11.5 - 14.5 %    Platelets 209 150 - 450 x10*3/uL    Neutrophils % 67.0 40.0 - 80.0 %    Immature Granulocytes %, Automated 0.4 0.0 - 0.9 %    Lymphocytes % 21.8 13.0 - 44.0 %    Monocytes % 7.6 2.0 - 10.0 %    Eosinophils % 2.6 0.0 - 6.0 %    Basophils % 0.6 0.0 - 2.0 %    Neutrophils Absolute 5.58 1.20 - 7.70 x10*3/uL    Immature Granulocytes Absolute, Automated 0.03 0.00 - 0.70 x10*3/uL    Lymphocytes Absolute 1.81 1.20 - 4.80 x10*3/uL    Monocytes Absolute 0.63 0.10 - 1.00 x10*3/uL    Eosinophils Absolute 0.22 0.00 - 0.70 x10*3/uL    Basophils Absolute 0.05 0.00 - 0.10 x10*3/uL   Comprehensive metabolic panel   Result Value Ref Range    Glucose 88 74 - 99 mg/dL    Sodium 145 136 - 145 mmol/L    Potassium 3.5 3.5 - 5.3 mmol/L    Chloride 109 (H) 98 - 107 mmol/L    Bicarbonate 27 21 - 32 mmol/L    Anion Gap 13 10 - 20 mmol/L    Urea Nitrogen 17 6 - 23 mg/dL    Creatinine 0.78 0.50 - 1.05 mg/dL    eGFR 82 >60 mL/min/1.73m*2    Calcium 8.9 8.6 - 10.3 mg/dL    Albumin 3.9 3.4 - 5.0 g/dL    Alkaline Phosphatase 70 33 - 136 U/L    Total Protein 6.7 6.4 - 8.2 g/dL    AST 52 (H) 9 - 39 U/L    Bilirubin, Total 0.6 0.0 - 1.2 mg/dL    ALT 71 (H) 7 - 45 U/L     MR lumbar spine wo IV contrast    Result Date:  11/22/2024  Interpreted By:  Sheila Finn, STUDY: MRI of the lumbar spine without contrast.   INDICATION: Signs/Symptoms:c/f disk herniation, L sided back pain, severe canal stenosis L3-5.   COMPARISON: Same-day CT lumbar spine study.   ACCESSION NUMBER(S): [id number]   ORDERING CLINICIAN: ELLIOTT MCKEON   TECHNIQUE: Multiplanar and multi-sequence images of the lumbar spine were obtained without intravenous contrast.   FINDINGS: This report assumes 5 non rib-bearing lumbar vertebral bodies. The lowest lumbar intervertebral disc will be labeled L5-S1.   There is normal alignment of the lumbar vertebra. The vertebral body heights are maintained. Bone marrow signal intensity is within normal limits.   Severe disc height loss at L2-3. Conus terminates at level beyond the field of view. No prevertebral or posterior paraspinal soft tissue signal abnormalities.   T12-L1:  No significant disc bulge. No central canal or neural foraminal stenosis.   L1-L2:  No significant disc bulge. No central canal or neural foraminal stenosis.   L2-L3:  Disc bulge noted asymmetric to the left with foraminal protrusion. Mild spinal canal stenosis. Bilateral lateral recess stenosis. Severe left foraminal stenosis.   L3-L4:  Disc bulge noted eccentric to the right with left foraminal protrusion resulting in mild spinal canal stenosis and bilateral lateral recess stenosis and moderate left foraminal stenosis.. Left facet joint degenerative changes with small effusion.   L4-L5:  Disc bulge resulting in mild spinal canal stenosis, mild bilateral lateral recess stenosis, and mild left foraminal stenosis.   L5-S1:  No significant disc bulge. No central canal or neural foraminal stenosis. Simple cysts in the bilateral kidneys.       1. Degenerative changes of the lumbar spine as described above with left-sided foraminal protrusions at L2-3 and L3-4 resulting in severe left L2-3 and moderate left L3-4 foraminal stenosis. Mild spinal canal  stenosis from L2-3 to L4-5. Multilevel bilateral lateral recess stenosis as above.     Signed by: Sheila Finn 11/22/2024 8:32 PM Dictation workstation:   [id number]    CT lumbar spine wo IV contrast    Result Date: 11/22/2024  Interpreted By:  Massimo Rojas, STUDY: CT LUMBAR SPINE WO IV CONTRAST; 11/22/2024 2:22 pm   INDICATION: Signs/Symptoms:hx of herniated disk, LLE pain and lower back pain, difficulty walking.   COMPARISON: None.   ACCESSION NUMBER(S): [id number]   ORDERING CLINICIAN: WALTER ARAUJO   TECHNIQUE: Contiguous axial CT images were obtained through the lumbar spine at 2 mm slice thickness without contrast administration. The images were then reconstructed in the coronal and sagittal planes.   FINDINGS: OSSEOUS STRUCTURES: There is no evidence of acute fracture identified. The vertebral bodies are well aligned without evidence of subluxation.   Moderate disc space narrowing and small to moderate marginal osteophytes are present at the L2-3 level. Mild-to-moderate facet degenerative changes are seen throughout the mid to lower lumbar spine.   At least moderate to severe central canal narrowing is seen at the L3-4 and L4-5 levels   ASSOCIATED STRUCTURES: Evaluation of the visualized soft tissues of the abdomen is limited by the lack of intravenous contrast. Within this limitation, no gross mass or lymphadenopathy is identified.       1. No acute fracture identified. 2. Degenerative changes, as described above.   MACRO: None.   Signed by: Massimo Rojas 11/22/2024 2:41 PM Dictation workstation:   [id number]       Assessment/Plan   Assessment & Plan  Spinal stenosis    Left sided sciatica    Abiola Bailey is a 69 y.o. female with a past medical history of chronic back pain, herniated disc, spinal stenosis, left-sided sciatica, hypertension, hiatal hernia, migraine, mixed hyperlipidemia, mitral valve disorder, complex tear of medial meniscus hepatic cyst, renal cyst, thyroid nodule, asthma and  uterine fibroids who was admitted to the hospital for severe back pain with trouble ambulating.      Severe back pain  -Patient has left sciatic with ambulatory dysfunction due to the pain  -She received oxycodone, Norflex, Ativan, lidocaine patch, ketorolac, gabapentin and dexamethasone in the emergency room.  -The pain finally started to improve.  Patient currently rates her pain as a 3 out of 10 and was able to take a nap on arrival to the floor after family members left  -Will admit to general medicine  -We will continue pain management  -Please note that patient was on meloxicam with Toradol and ibuprofen  -Will only continue meloxicam to prevent GI side effects  -Start a tapering course of prednisone  -Continue gabapentin and baclofen.  -Give morphine as needed  -Also keep Narcan on board in case of overdose.  -PT/OT eval  -Follow-up with neurosurgery as outpatient    Hypoxia  -Oxygen saturation dropped when patient took a nap on the floor  -This is likely in part due to the medications  -Patient may also have underlying undiagnosed sleep apnea  -She is now on 2 L of oxygen via nasal cannula  -Will try to wean her off the oxygen  -Follow-up with PCP as outpatient regarding possible sleep studies.     Asthma  -Appears stable    Hypertension  -Resume valsartan/HCTZ and verapamil    DVT prophylaxis  -Lovenox    Addendum: MRI was discussed with neurosurgeon (Onur Vargas).  He recommended follow-up with Dr. Chanelle Lopez in 1 week.  Also follow-up with pain management as outpatient.  PT/OT eval.  Continue current management.       Catrina Anthony MD

## 2024-11-23 NOTE — ASSESSMENT & PLAN NOTE
Abiola Bailey is a 69 y.o. female with a past medical history of chronic back pain, herniated disc, spinal stenosis, left-sided sciatica, hypertension, hiatal hernia, migraine, mixed hyperlipidemia, mitral valve disorder, complex tear of medial meniscus hepatic cyst, renal cyst, thyroid nodule, asthma and uterine fibroids who was admitted to the hospital for severe back pain with trouble ambulating.      Severe back pain  -Patient has left sciatic with ambulatory dysfunction due to the pain  -She received oxycodone, Norflex, Ativan, lidocaine patch, ketorolac, gabapentin and dexamethasone in the emergency room.  -The pain finally started to improve.  Patient currently rates her pain as a 3 out of 10 and was able to take a nap on arrival to the floor after family members left  -Will admit to general medicine  -We will continue pain management  -Please note that patient was on meloxicam with Toradol and ibuprofen  -Will only continue meloxicam to prevent GI side effects  -Start a tapering course of prednisone  -Continue gabapentin and baclofen.  -Give morphine as needed  -Also keep Narcan on board in case of overdose.  -PT/OT eval  -Follow-up with neurosurgery as outpatient    Hypoxia  -Oxygen saturation dropped when patient took a nap on the floor  -This is likely in part due to the medications  -Patient may also have underlying undiagnosed sleep apnea  -She is now on 2 L of oxygen via nasal cannula  -Will try to wean her off the oxygen  -Follow-up with PCP as outpatient regarding possible sleep studies.

## 2024-11-23 NOTE — PROGRESS NOTES
Physical Therapy    Physical Therapy Evaluation    Patient Name: Abiola Bailey  MRN: [id number]  Department: [address]  Room: 223223B  Today's Date: 11/23/2024   Time Calculation  Start Time: 0921  Stop Time: 0933  Time Calculation (min): 12 min    Assessment/Plan Pt is a 70 yo female who presents to the hospital with severe back pain limiting her ability to ambulate. Prior to admission pt was independent with all mobility and ADL's, working full time as a nurse. Pt presents with limited mobility secondary to pain intensity. Pt may benefit from PT services at this time to improve mobility once pain has decreased to a tolerable level to complete all daily mobility activities.   PT Assessment  PT Assessment Results: Pain, Decreased mobility  Rehab Prognosis: Fair  Evaluation/Treatment Tolerance: Patient limited by pain  Medical Staff Made Aware: Yes  Strengths: Ability to acquire knowledge, Physical health  Barriers to Participation:  (Pain)  End of Session Communication: Bedside nurse  End of Session Patient Position: Bed, 3 rail up, Alarm on  IP OR SWING BED PT PLAN  Inpatient or Swing Bed: Inpatient  PT Plan  Treatment/Interventions: Bed mobility, Transfer training, Gait training, Therapeutic activity, Therapeutic exercise  PT Plan: Ongoing PT  PT Frequency: 3 times per week  PT Discharge Recommendations: Other (comment) (Pt did not require physical assist with mobility completd, although was limited in mobility activity due to pain. At this time discharge recommendation is MOD/HIGH secondary to pt mobility being limited by pain.)  Equipment Recommended upon Discharge: Wheeled walker  PT Recommended Transfer Status: Assist x2  PT - OK to Discharge: Yes    Subjective   General Visit Information:  General  Reason for Referral: Pt admitted with severe back pain with trouble ambulating  Referred By: Catrina Anthony MD  Past Medical History Relevant to Rehab: chronic back pain, herniated disc, spinal stenosis,  left-sided sciatica, hypertension, hiatal hernia, migraine, mixed hyperlipidemia, mitral valve disorder, complex tear of medial meniscus hepatic cyst, renal cyst, thyroid nodule, asthma and uterine fibroids  Family/Caregiver Present: No  Co-Treatment: OT  Co-Treatment Reason: To maximize pt mobility safely  Prior to Session Communication: Bedside nurse  Patient Position Received: Bed, 3 rail up, Alarm on  General Comment: Mobility limited secondary to low back and proximal LLE pain  Home Living:  Home Living  Type of Home: House  Lives With: Alone  Home Adaptive Equipment: Walker rolling or standard  Home Layout: One level  Home Access: Stairs to enter with rails  Entrance Stairs-Number of Steps: 3  Bathroom Shower/Tub: Walk-in shower  Bathroom Toilet: Standard  Prior Level of Function:  Prior Function Per Pt/Caregiver Report  Level of [place]: Independent with ADLs and functional transfers, Independent with homemaking with ambulation  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[employer])  Precautions:  Precautions  Medical Precautions: Fall precautions, Spinal precautions     Vital Signs (Past 2hrs)                 Objective   Pain:  Pain Assessment  Pain Assessment:  (At rest 7/10 low back and proximal LLE, 12/10 per pt with mobility)  Pain Interventions: Repositioned  Cognition:  Cognition  Overall Cognitive Status: Within Functional Limits    General Assessments:  General Observation  General Observation: Continuous puls ox     Activity Tolerance  Activity Tolerance Comments: Pt activity and mobility limited by pain      Static Sitting Balance  Static Sitting-Balance Support: Feet unsupported, Bilateral upper extremity supported  Static Sitting-Comment/Number of Minutes: ~20 second secondary to pain       Functional Assessments:  Bed Mobility  Bed Mobility: Yes  Bed Mobility 1  Bed Mobility 1: Supine to sitting  Level of Assistance 1: Contact guard  Bed Mobility 2  Bed Mobility  2: Sitting to  supine  Level of Assistance 2: Minimum assistance  Bed Mobility Comments 2: BLE assist    Transfers  Transfer: No    Ambulation/Gait Training  Ambulation/Gait Training Performed: No    Outcome Measures:  [facility] Basic Mobility  Turning from your back to your side while in a flat bed without using bedrails: A little  Moving from lying on your back to sitting on the side of a flat bed without using bedrails: A little  Moving to and from bed to chair (including a wheelchair): A lot  Standing up from a chair using your arms (e.g. wheelchair or bedside chair): Total  To walk in hospital room: Total  Climbing 3-5 steps with railing: Total  Basic Mobility - Total Score: 11    Encounter Problems       Encounter Problems (Active)       Mobility       STG - Patient will ambulate at least 50'x2, LRAD, distant supervision       Start:  11/23/24    Expected End:  12/07/24               PT Transfers       STG - Patient to transfer to and from sit to supine mod I       Start:  11/23/24    Expected End:  12/07/24            STG - Patient will transfer sit to and from stand with LRAD, close supervision       Start:  11/23/24    Expected End:  12/07/24                   Education Documentation  Body Mechanics, taught by Shirley Hernadez PT at 11/23/2024 12:15 PM.  Learner: Patient  Readiness: Acceptance  Method: Explanation, Demonstration  Response: Verbalizes Understanding, Demonstrated Understanding    Mobility Training, taught by Shirley Hernadez PT at 11/23/2024 12:15 PM.  Learner: Patient  Readiness: Acceptance  Method: Explanation, Demonstration  Response: Verbalizes Understanding, Demonstrated Understanding    Education Comments  No comments found.

## 2024-11-23 NOTE — PROGRESS NOTES
Occupational Therapy    Evaluation    Patient Name: Abiola Bailey  MRN: [id number]  Department: [facility]  Room: [facility]223B  Today's Date: 11/23/2024  Time Calculation  Start Time: 0921  Stop Time: 0933  Time Calculation (min): 12 min    Assessment  IP OT Assessment  OT Assessment: Pt presents with decreased activity tolerance impacting ADLs and mobility. Would benefit from continued OT to maximize independence.  Prognosis: Good  Barriers to Discharge: None  Evaluation/Treatment Tolerance: Patient limited by pain  Medical Staff Made Aware: Yes  End of Session Communication: Bedside nurse  End of Session Patient Position: Bed, 3 rail up, Alarm on  Plan:  Treatment Interventions: ADL retraining, Functional transfer training  OT Frequency: 3 times per week  OT Discharge Recommendations: Other (Comment) (Overall pt demonstrates ability to mobilize well with no assist needed, however is very limited by pain. Recommend mod vs high intensity secondary to pain.)  Equipment Recommended upon Discharge: Wheeled walker  OT Recommended Transfer Status: Assist of 1  OT - OK to Discharge: Yes (per OT POC)    Subjective   Current Problem:  1. Acute left-sided low back pain with left-sided sciatica        2. Central stenosis of spinal canal  Referral to Orthopaedic Surgery        General:  General  Reason for Referral: 68 yo female referred to OT for spinal stenosis/L side sciatica  Referred By: Catrina Anthony MD  Past Medical History Relevant to Rehab: chronic back pain, herniated disc, spinal stenosis, left-sided sciatica, hypertension, hiatal hernia, migraine, mixed hyperlipidemia, mitral valve disorder, complex tear of medial meniscus hepatic cyst, renal cyst, thyroid nodule, asthma and uterine fibroids  Co-Treatment: PT  Co-Treatment Reason: To maximize pt mobility safely  Prior to Session Communication: Bedside nurse  Patient Position Received: Bed, 3 rail up, Alarm on  General Comment: Mobility limited secondary to low back  and proximal LLE pain  Precautions:  Medical Precautions: Fall precautions, Spinal precautions           Pain:  Pain Assessment  Pain Assessment:  (At rest 7/10 low back and proximal LLE, 12/10 per pt with mobility. RN notified)  Pain Interventions: Cold applied, Repositioned    Objective   Cognition:  Overall Cognitive Status: Within Functional Limits  Orientation Level: Oriented X4           Home Living:  Type of Home: House  Lives With: Alone  Home Adaptive Equipment: Walker rolling or standard  Home Layout: One level  Home Access: Stairs to enter with rails  Entrance Stairs-Number of Steps: 3  Bathroom Shower/Tub: Walk-in shower  Bathroom Toilet: Standard   Prior Function:  Level of [place]: Independent with ADLs and functional transfers, Independent with homemaking with ambulation  ADL Assistance: Independent  Homemaking Assistance: Independent  Ambulatory Assistance: Independent  IADL History:  Mode of Transportation:  (drives self)  Occupation: Full time employment  ADL:  Eating Assistance: Independent  Grooming Deficit: Setup  Bathing Assistance: Moderate  UE Dressing Assistance: Minimal  LE Dressing Assistance: Total  Toileting Assistance with Device: Moderate  Functional Assistance: Maximal  ADL Comments: ADL performance anticipated based on decreased activity tolerance secondary to pain  Activity Tolerance:  Activity Tolerance Comments: Pt activity and mobility limited by pain  Bed Mobility/Transfers: Bed Mobility  Bed Mobility: Yes  Bed Mobility 1  Bed Mobility 1: Supine to sitting  Level of Assistance 1: Contact guard  Bed Mobility 2  Bed Mobility  2: Sitting to supine  Level of Assistance 2: Minimum assistance  Bed Mobility Comments 2: BLE assist    Transfers  Transfer: No (Pt with intolerable pain attempting to sit EOB, unable to attempt stand)    Sitting Balance:  Static Sitting Balance  Static Sitting-Balance Support: Feet unsupported  Static Sitting-Level of Assistance: Minimum  assistance  Static Sitting-Comment/Number of Minutes: unable to achieve full sit or  tolerate more than 30 seconds in sitting position d/t pain    IADL's:   Mode of Transportation:  (drives self)  Occupation: Full time employment    Strength:  Strength Comments: BUE    Coordination:  Movements are Fluid and Coordinated: Yes   Hand Function:  Hand Function  Gross Grasp: Functional  Coordination: Functional  Extremities: RUE   RUE : Within Functional Limits and LUE   LUE: Within Functional Limits    Outcome Measures: [facility] Daily Activity  Putting on and taking off regular lower body clothing: A lot  Bathing (including washing, rinsing, drying): A lot  Putting on and taking off regular upper body clothing: A little  Toileting, which includes using toilet, bedpan or urinal: A lot  Taking care of personal grooming such as brushing teeth: A little  Eating Meals: None  Daily Activity - Total Score: 16      Education Documentation  Body Mechanics, taught by Jose Montilla OT at 11/23/2024 12:58 PM.  Learner: Patient  Readiness: Acceptance  Method: Explanation  Response: Verbalizes Understanding    Precautions, taught by Jose Montilla OT at 11/23/2024 12:58 PM.  Learner: Patient  Readiness: Acceptance  Method: Explanation  Response: Verbalizes Understanding    ADL Training, taught by Jose Montilla OT at 11/23/2024 12:58 PM.  Learner: Patient  Readiness: Acceptance  Method: Explanation  Response: Verbalizes Understanding    Education Comments  No comments found.      Goals:   Encounter Problems       Encounter Problems (Active)       OT Goals       Pt will increase static/dynamic sitting to Good to increase safety and independence with functional task completion.         Start:  11/23/24    Expected End:  12/07/24            Pt will increase endurance to tolerate 15min of EOB activity with no more than 1 rest break in order to increase ability to engage in ADL completion.        Start:  11/23/24    Expected  End:  12/07/24            Pt will tolerate 10min stand during functional task completion with no more than 1 rest break in order to increase endurance for functional task completion.        Start:  11/23/24    Expected End:  12/07/24            Pt will demo LE ADL completion with modified independence, using AE if needed.        Start:  11/23/24    Expected End:  12/07/24            Pt will complete [id number] transfers using LRD in preparation for ADLs with supervision        Start:  11/23/24    Expected End:  12/07/24

## 2024-11-23 NOTE — PROGRESS NOTES
[facility] Hospitalist Progress Note       [phone]: Please page me for patient care issues.  [phone]: Please page night hospitalist for any issues.     Abiola Bailey  :  1955(69 y.o.)  MRN:  [id number]  PCP: Ivette Edmonds MD      Principal Problem:    Spinal stenosis  Active Problems:    Left sided sciatica      Assessment and Plan:     Abiola Bailey is a 69 y.o. female with a past medical history of chronic back pain, herniated disc, spinal stenosis, left-sided sciatica, hypertension, hiatal hernia, migraine, mixed hyperlipidemia, mitral valve disorder, complex tear of medial meniscus hepatic cyst, renal cyst, thyroid nodule, asthma and uterine fibroids who was admitted to the hospital for recurrent severe back pain with trouble ambulating. She bent down to  something from the ground earlier this week and since that time she has been having progressively worsening lower back pain. She contacted her primary care physician on Monday and was prescribed gabapentin and baclofen because they have worked well for her in the past. However she was not getting any relief. She states that the pain worsened to the point where she could not walk. The pain is usually in the back as well as in the left thigh. She reported no recent trauma, or injury except for this morning when she fell twice and landed on her bottom while getting out of the toilet. She did not hit her head or pass out. She reported no paresthesia, saddle anesthesia, bowel or bladder incontinence. She states that her last bowel movement was on Wednesday. She also states that since the pain started she has not have much appetite. She denies fever, chills, chest pain, difficulty breathing, headache, dizziness, or skin rash.    MRI obtained in ED showed Degenerative changes of the lumbar spine as described above with left-sided foraminal protrusions at L2-3 and L3-4 resulting in severe left L2-3 and moderate left L3-4 foraminal stenosis.  Mild spinal canal stenosis from L2-3 to L4-5. Multilevel bilateral lateral recess stenosis. Reportedly, MRI was discussed with neurosurgeon (Onur Vargas).  He recommended follow-up with Dr. Chanelle Lopez in 1 week.  Also follow-up with pain management as outpatient    #Acute on chronic LBP, due to lumbar stenosis w/ radiculopathy,  intractable  #Transient hypoxia due to opiates and suspected OHS  #Asthma w/o exacerbation, on bronchodilators  #Chronic NSAID use  #HTN  #Suspected JOHN/OHS  #Class III obesity BMI 40 or > with serious co morbidities, BMI:40.74 kg/m² ,   -on cocktail of analgesics with narcan, and bowel regimen  -caution with NSAIDs in geriatric pt  -awaiting PT/OT eval  -rec outpt F/U with  neurosurgery and pain management     Disposition: await clinical improvement    DVT Prophylaxis: Subq Lovenox    Code status: Full Code  Diet: Adult diet Regular, 2-3 grams sodium    Level of MDM:  Moderate    I personally examined the patient and I personally reviewed chart, data, labs radiology reports      Total time spent: 35 minutes, of total time providing counseling or in coordination of care. Total time on this day of visit includes record and documentation review before and after visit including documentation and time not explicitly included on EMR time stamp      Subjective:   Interval History:  HPI  The patient complains of LBP  The patient feels their symptoms areimproving  no events or new concerns    Scheduled Meds:acetaminophen, 975 mg, oral, q8h  baclofen, 10 mg, oral, TID  cholecalciferol, 1,000 Units, oral, Daily  enoxaparin, 40 mg, subcutaneous, q12h SKYLER  fluticasone furoate-vilanteroL, 1 puff, inhalation, Daily  gabapentin, 300 mg, oral, TID  [Held by provider] hydroCHLOROthiazide, 12.5 mg, oral, Daily  meloxicam, 15 mg, oral, Daily  pantoprazole, 40 mg, oral, BID AC  polyethylene glycol, 17 g, oral, BID  predniSONE, 20 mg, oral, Daily   Followed by  [START ON 11/26/2024] predniSONE, 15 mg, oral,  "Daily   Followed by  [START ON 2024] predniSONE, 10 mg, oral, Daily   Followed by  [START ON 2024] predniSONE, 5 mg, oral, Daily  sennosides-docusate sodium, 2 tablet, oral, BID  sertraline, 100 mg, oral, Daily  valsartan 80 mg, hydroCHLOROthiazide 12.5 mg for Diovan HCT, , oral, Daily  verapamil SR, 180 mg, oral, Daily      Continuous Infusions:   PRN Meds:PRN medications: albuterol, morphine, naloxone, oxyCODONE **OR** oxyCODONE, polyethylene glycol    Review of Systems   All other systems reviewed and are negative.    Interval Pertinent History:  Social History     Tobacco Use    Smoking status: Never    Smokeless tobacco: Never   Substance Use Topics    Alcohol use: Not on file         Objective:   Patient Vitals for the past 24 hrs:   BP Temp Temp src Pulse Resp SpO2 Height Weight   24 0900 -- -- -- -- -- 97 % -- --   24 0840 131/81 -- -- 56 -- 98 % -- --   24 0401 117/74 36 °C (96.8 °F) -- 55 16 94 % -- --   24 2358 136/80 35.9 °C (96.6 °F) Temporal 60 -- 93 % -- --   24 2154 -- -- -- -- -- -- 1.6 m (5' 3\") 104 kg (230 lb)   24 2137 132/70 36.3 °C (97.3 °F) Temporal 62 18 92 % -- --   24 2100 119/73 -- -- 68 -- 96 % -- --   24 1845 141/78 -- -- 66 18 99 % -- --   24 1615 131/71 -- -- 68 16 98 % -- --   24 1400 141/82 -- -- -- -- -- -- --   24 1345 154/76 -- -- -- -- -- -- --   24 1330 151/88 -- -- -- -- 97 % -- --   24 1317 138/78 36.5 °C (97.7 °F) -- 62 18 98 % -- 102 kg (225 lb)       Average, Min, and Max for last 24 hours Vitals:  TEMPERATURE:  Temp  Av.2 °C (97.1 °F)  Min: 35.9 °C (96.6 °F)  Max: 36.5 °C (97.7 °F)    RESPIRATIONS RANGE: Resp  Av.2  Min: 16  Max: 18    PULSE RANGE: Pulse  Av.1  Min: 55  Max: 68    BLOOD PRESSURE RANGE:  Systolic (24hrs), Av , Min:117 , Max:154   ; Diastolic (24hrs), Av, Min:70, Max:88      PULSE OXIMETRY RANGE: SpO2  Av.2 %  Min: 92 %  Max: 99 %  Body mass " index is 40.74 kg/m².    No intake/output data recorded.  Weight change:      Physical Exam  Vitals and nursing note reviewed.   Constitutional:       Appearance: Normal appearance.   HENT:      Head: Normocephalic and atraumatic.      Right Ear: External ear normal.      Left Ear: External ear normal.      Nose: Nose normal.      Mouth/Throat:      Mouth: Mucous membranes are moist.   Eyes:      General: No scleral icterus.        Right eye: No discharge.         Left eye: No discharge.      Extraocular Movements: Extraocular movements intact.      Conjunctiva/sclera: Conjunctivae normal.      Pupils: Pupils are equal, round, and reactive to light.   Cardiovascular:      Rate and Rhythm: Normal rate and regular rhythm.   Pulmonary:      Effort: Pulmonary effort is normal.      Breath sounds: Normal breath sounds.   Abdominal:      General: Abdomen is flat. Bowel sounds are normal.      Palpations: Abdomen is soft.   Musculoskeletal:         General: Tenderness present. Normal range of motion.      Right lower leg: No edema.      Left lower leg: No edema.   Skin:     General: Skin is warm and dry.      Capillary Refill: Capillary refill takes less than 2 seconds.   Neurological:      General: No focal deficit present.   Psychiatric:         Mood and Affect: Mood normal.         Thought Content: Thought content normal.         Judgment: Judgment normal.         Lab Results   Component Value Date     11/23/2024    K 4.7 11/23/2024     11/23/2024    CO2 28 11/23/2024    BUN 17 11/23/2024    CREATININE 0.78 11/23/2024    GLUCOSE 119 (H) 11/23/2024    CALCIUM 9.5 11/23/2024    PROT 6.7 11/22/2024    BILITOT 0.6 11/22/2024    ALKPHOS 70 11/22/2024    AST 52 (H) 11/22/2024    ALT 71 (H) 11/22/2024       Lab Results   Component Value Date    WBC 7.8 11/23/2024    HGB 15.5 11/23/2024    HCT 45.6 11/23/2024    MCV 89 11/23/2024     11/23/2024    LYMPHOPCT 21.8 11/22/2024    RBC 5.15 11/23/2024    MCH 30.1  "11/23/2024    MCHC 34.0 11/23/2024    RDW 12.3 11/23/2024       No results found for: \"TSH\"  No results found for: \"LACTATE\", \"TROPONINI\", \"BNP\", \"DDIMER\", \"INR\"    IMAGES:  No results found for this or any previous visit (from the past 4464 hours).     No echocardiogram results found for the past 12 months  === 02/17/14 ===    - Impression -  1: No evidence of fracture or dislocation.    2: Degenerative changes, as described above.      This study was interpreted and dictated at [facility] in [place], [place]  === 11/22/24 ===    CT LUMBAR SPINE WO IV CONTRAST    - Impression -  1. No acute fracture identified.  2. Degenerative changes, as described above.    MACRO:  None.    Signed by: Massimo Rojas 11/22/2024 2:41 PM  Dictation workstation:   [id number]  === 02/17/14 ===    - Impression -  1: No evidence of fracture or dislocation.    2: Degenerative changes, as described above.      This study was interpreted and dictated at [facility] in [place], [place]  === 11/22/24 ===    MR LUMBAR SPINE WO CONTRAST    - Impression -  1. Degenerative changes of the lumbar spine as described above with  left-sided foraminal protrusions at L2-3 and L3-4 resulting in severe  left L2-3 and moderate left L3-4 foraminal stenosis. Mild spinal  canal stenosis from L2-3 to L4-5. Multilevel bilateral lateral recess  stenosis as above.      Signed by: Sheila Finn 11/22/2024 8:32 PM  Dictation workstation:   [id number]    Additional results of the last 24 hours have been reviewed.    Dictated using Smart[employer] Ants Version 2.4  Proof read however unrecognized voice recognition errors may have occurred     Electronically signed by Erwin Machado DO on 11/23/24 at 9:37 AM     "

## 2024-11-24 VITALS
TEMPERATURE: 96.8 F | BODY MASS INDEX: 40.75 KG/M2 | DIASTOLIC BLOOD PRESSURE: 77 MMHG | HEIGHT: 63 IN | WEIGHT: 230 LBS | HEART RATE: 58 BPM | OXYGEN SATURATION: 94 % | RESPIRATION RATE: 16 BRPM | SYSTOLIC BLOOD PRESSURE: 117 MMHG

## 2024-11-24 PROCEDURE — 96376 TX/PRO/DX INJ SAME DRUG ADON: CPT

## 2024-11-24 PROCEDURE — 2500000004 HC RX 250 GENERAL PHARMACY W/ HCPCS (ALT 636 FOR OP/ED): Performed by: INTERNAL MEDICINE

## 2024-11-24 PROCEDURE — 99232 SBSQ HOSP IP/OBS MODERATE 35: CPT | Performed by: INTERNAL MEDICINE

## 2024-11-24 PROCEDURE — 2500000001 HC RX 250 WO HCPCS SELF ADMINISTERED DRUGS (ALT 637 FOR MEDICARE OP): Performed by: INTERNAL MEDICINE

## 2024-11-24 PROCEDURE — 96372 THER/PROPH/DIAG INJ SC/IM: CPT | Mod: 59 | Performed by: INTERNAL MEDICINE

## 2024-11-24 PROCEDURE — 97530 THERAPEUTIC ACTIVITIES: CPT | Mod: GP,CQ

## 2024-11-24 PROCEDURE — 97110 THERAPEUTIC EXERCISES: CPT | Mod: GP,CQ

## 2024-11-24 PROCEDURE — G0378 HOSPITAL OBSERVATION PER HR: HCPCS

## 2024-11-24 PROCEDURE — 2500000002 HC RX 250 W HCPCS SELF ADMINISTERED DRUGS (ALT 637 FOR MEDICARE OP, ALT 636 FOR OP/ED): Performed by: INTERNAL MEDICINE

## 2024-11-24 RX ORDER — OXYCODONE HYDROCHLORIDE 5 MG/1
10 TABLET ORAL EVERY 4 HOURS PRN
Status: DISCONTINUED | OUTPATIENT
Start: 2024-11-24 | End: 2024-11-27 | Stop reason: HOSPADM

## 2024-11-24 RX ORDER — OXYCODONE HYDROCHLORIDE 5 MG/1
5 TABLET ORAL EVERY 4 HOURS PRN
Status: DISCONTINUED | OUTPATIENT
Start: 2024-11-24 | End: 2024-11-27 | Stop reason: HOSPADM

## 2024-11-24 RX ORDER — FLUTICASONE PROPIONATE AND SALMETEROL XINAFOATE 45; 21 UG/1; UG/1
2 AEROSOL, METERED RESPIRATORY (INHALATION)
Status: DISCONTINUED | OUTPATIENT
Start: 2024-11-24 | End: 2024-11-27 | Stop reason: HOSPADM

## 2024-11-24 ASSESSMENT — COGNITIVE AND FUNCTIONAL STATUS - GENERAL
DAILY ACTIVITIY SCORE: 17
STANDING UP FROM CHAIR USING ARMS: A LOT
PERSONAL GROOMING: A LITTLE
CLIMB 3 TO 5 STEPS WITH RAILING: A LOT
CLIMB 3 TO 5 STEPS WITH RAILING: A LOT
HELP NEEDED FOR BATHING: A LITTLE
WALKING IN HOSPITAL ROOM: A LOT
WALKING IN HOSPITAL ROOM: A LOT
TOILETING: A LOT
STANDING UP FROM CHAIR USING ARMS: A LOT
TURNING FROM BACK TO SIDE WHILE IN FLAT BAD: A LOT
MOVING TO AND FROM BED TO CHAIR: A LOT
DRESSING REGULAR UPPER BODY CLOTHING: A LITTLE
DRESSING REGULAR UPPER BODY CLOTHING: A LITTLE
MOBILITY SCORE: 15
TURNING FROM BACK TO SIDE WHILE IN FLAT BAD: A LITTLE
STANDING UP FROM CHAIR USING ARMS: TOTAL
CLIMB 3 TO 5 STEPS WITH RAILING: TOTAL
MOVING FROM LYING ON BACK TO SITTING ON SIDE OF FLAT BED WITH BEDRAILS: A LITTLE
MOVING TO AND FROM BED TO CHAIR: A LOT
DRESSING REGULAR LOWER BODY CLOTHING: A LOT
DAILY ACTIVITIY SCORE: 17
DRESSING REGULAR LOWER BODY CLOTHING: A LOT
HELP NEEDED FOR BATHING: A LITTLE
WALKING IN HOSPITAL ROOM: TOTAL
PERSONAL GROOMING: A LITTLE
MOBILITY SCORE: 9
MOVING TO AND FROM BED TO CHAIR: TOTAL
TURNING FROM BACK TO SIDE WHILE IN FLAT BAD: A LITTLE
TOILETING: A LOT
MOBILITY SCORE: 15

## 2024-11-24 ASSESSMENT — PAIN DESCRIPTION - ORIENTATION
ORIENTATION: LEFT

## 2024-11-24 ASSESSMENT — PAIN - FUNCTIONAL ASSESSMENT
PAIN_FUNCTIONAL_ASSESSMENT: 0-10

## 2024-11-24 ASSESSMENT — PAIN SCALES - GENERAL
PAINLEVEL_OUTOF10: 8
PAINLEVEL_OUTOF10: 5 - MODERATE PAIN
PAINLEVEL_OUTOF10: 10 - WORST POSSIBLE PAIN
PAINLEVEL_OUTOF10: 3
PAINLEVEL_OUTOF10: 9
PAINLEVEL_OUTOF10: 8
PAINLEVEL_OUTOF10: 10 - WORST POSSIBLE PAIN

## 2024-11-24 ASSESSMENT — PAIN DESCRIPTION - LOCATION
LOCATION: OTHER (COMMENT)
LOCATION: HIP

## 2024-11-24 ASSESSMENT — PAIN SCALES - PAIN ASSESSMENT IN ADVANCED DEMENTIA (PAINAD): TOTALSCORE: MEDICATION (SEE MAR)

## 2024-11-24 ASSESSMENT — ENCOUNTER SYMPTOMS: BACK PAIN: 1

## 2024-11-24 NOTE — PROGRESS NOTES
[facility] Hospitalist Progress Note       [phone]: Please page me for patient care issues.  [phone]: Please page night hospitalist for any issues.     Abiola Bailey  :  1955(69 y.o.)  MRN:  [id number]  PCP: Ivette Edmonds MD      Principal Problem:    Spinal stenosis  Active Problems:    Left sided sciatica      Assessment and Plan:     Abiola Bailey is a 69 y.o. female with a past medical history of chronic back pain, herniated disc, spinal stenosis, left-sided sciatica, hypertension, hiatal hernia, migraine, mixed hyperlipidemia, mitral valve disorder, complex tear of medial meniscus hepatic cyst, renal cyst, thyroid nodule, asthma and uterine fibroids who was admitted to the hospital for recurrent severe back pain with trouble ambulating. She bent down to  something from the ground earlier this week and since that time she has been having progressively worsening lower back pain. She contacted her primary care physician on Monday and was prescribed gabapentin and baclofen because they have worked well for her in the past. However she was not getting any relief. She states that the pain worsened to the point where she could not walk. The pain is usually in the back as well as in the left thigh. She reported no recent trauma, or injury except for this morning when she fell twice and landed on her bottom while getting out of the toilet. She did not hit her head or pass out. She reported no paresthesia, saddle anesthesia, bowel or bladder incontinence. She states that her last bowel movement was on Wednesday. She also states that since the pain started she has not have much appetite. She denies fever, chills, chest pain, difficulty breathing, headache, dizziness, or skin rash.    MRI obtained in ED showed Degenerative changes of the lumbar spine as described above with left-sided foraminal protrusions at L2-3 and L3-4 resulting in severe left L2-3 and moderate left L3-4 foraminal stenosis.  Mild spinal canal stenosis from L2-3 to L4-5. Multilevel bilateral lateral recess stenosis. Reportedly, MRI was discussed with neurosurgeon (Onur Vargas).  He recommended follow-up with Dr. Chanelle Lopez in 1 week.  Also follow-up with pain management as outpatient    #Acute on chronic LBP, due to lumbar stenosis w/ radiculopathy,  intractable, slowly improving  #Transient hypoxia due to opiates and suspected OHS  #Asthma w/o exacerbation, on bronchodilators  #Chronic NSAID use  #HTN  #Suspected JOHN/OHS  #Class III obesity BMI 40 or > with serious co morbidities, BMI:40.74 kg/m² ,   -on cocktail of analgesics with narcan, and bowel regimen  -caution with NSAIDs in geriatric pt  -awaiting PT/OT eval  -rec outpt F/U with  neurosurgery and pain management     Disposition: await clinical improvement    DVT Prophylaxis: Subq Lovenox    Code status: Full Code  Diet: Adult diet Regular, 2-3 grams sodium    Level of MDM:  Moderate    I personally examined the patient and I personally reviewed chart, data, labs radiology reports      Total time spent: 35 minutes, of total time providing counseling or in coordination of care. Total time on this day of visit includes record and documentation review before and after visit including documentation and time not explicitly included on EMR time stamp      Subjective:   Interval History:  HPI  The patient complains of LBP  The patient feels their symptoms areimproving  no events or new concerns    Scheduled Meds:acetaminophen, 975 mg, oral, q8h  baclofen, 10 mg, oral, TID  cholecalciferol, 1,000 Units, oral, Daily  enoxaparin, 40 mg, subcutaneous, q12h [facility]  fluticasone furoate-vilanteroL, 1 puff, inhalation, Daily  gabapentin, 300 mg, oral, TID  [Held by provider] hydroCHLOROthiazide, 12.5 mg, oral, Daily  meloxicam, 15 mg, oral, Daily  pantoprazole, 40 mg, oral, BID AC  polyethylene glycol, 17 g, oral, BID  predniSONE, 20 mg, oral, Daily   Followed by  [START ON 11/26/2024]  predniSONE, 15 mg, oral, Daily   Followed by  [START ON 2024] predniSONE, 10 mg, oral, Daily   Followed by  [START ON 2024] predniSONE, 5 mg, oral, Daily  sennosides-docusate sodium, 2 tablet, oral, BID  sertraline, 100 mg, oral, Daily  valsartan 80 mg, hydroCHLOROthiazide 12.5 mg for Diovan HCT, , oral, Daily  verapamil SR, 180 mg, oral, Daily      Continuous Infusions:   PRN Meds:PRN medications: ipratropium-albuteroL, morphine, naloxone, oxyCODONE **OR** oxyCODONE, polyethylene glycol    Review of Systems   All other systems reviewed and are negative.    Interval Pertinent History:  Social History     Tobacco Use    Smoking status: Never    Smokeless tobacco: Never   Substance Use Topics    Alcohol use: Not on file         Objective:   Patient Vitals for the past 24 hrs:   BP Temp Temp src Pulse Resp SpO2   24 0700 141/85 36.8 °C (98.2 °F) Temporal 58 18 95 %   24 0345 113/69 36.4 °C (97.5 °F) Temporal 52 13 92 %   24 2315 108/63 36.5 °C (97.7 °F) Temporal 55 15 93 %   247 -- -- -- -- -- 93 %   24 1930 121/72 36.7 °C (98.1 °F) Temporal 55 16 94 %   24 1736 111/70 37 °C (98.6 °F) Temporal 52 18 93 %   24 1349 119/76 35.9 °C (96.6 °F) Temporal 57 16 97 %       Average, Min, and Max for last 24 hours Vitals:  TEMPERATURE:  Temp  Av.6 °C (97.8 °F)  Min: 35.9 °C (96.6 °F)  Max: 37 °C (98.6 °F)    RESPIRATIONS RANGE: Resp  Av  Min: 13  Max: 18    PULSE RANGE: Pulse  Av.8  Min: 52  Max: 58    BLOOD PRESSURE RANGE:  Systolic (24hrs), Av , Min:108 , Max:141   ; Diastolic (24hrs), Av, Min:63, Max:85      PULSE OXIMETRY RANGE: SpO2  Av.9 %  Min: 92 %  Max: 97 %  Body mass index is 40.74 kg/m².    I/O last 3 completed shifts:  In: 240 (2.3 mL/kg) [P.O.:240]  Out: 850 (8.1 mL/kg) [Urine:850 (0.2 mL/kg/hr)]  Weight: 104.3 kg   Weight change:      Physical Exam  Vitals and nursing note reviewed.   Constitutional:       Appearance: Normal  "appearance.   HENT:      Head: Normocephalic and atraumatic.      Right Ear: External ear normal.      Left Ear: External ear normal.      Nose: Nose normal.      Mouth/Throat:      Mouth: Mucous membranes are moist.   Eyes:      General: No scleral icterus.        Right eye: No discharge.         Left eye: No discharge.      Extraocular Movements: Extraocular movements intact.      Conjunctiva/sclera: Conjunctivae normal.      Pupils: Pupils are equal, round, and reactive to light.   Cardiovascular:      Rate and Rhythm: Regular rhythm.   Pulmonary:      Effort: Pulmonary effort is normal.      Breath sounds: Normal breath sounds.   Abdominal:      General: Abdomen is flat. Bowel sounds are normal.      Palpations: Abdomen is soft.   Musculoskeletal:         General: Tenderness present. Normal range of motion.      Right lower leg: No edema.      Left lower leg: No edema.   Skin:     General: Skin is warm and dry.      Capillary Refill: Capillary refill takes less than 2 seconds.   Neurological:      General: No focal deficit present.   Psychiatric:         Mood and Affect: Mood normal.         Thought Content: Thought content normal.         Judgment: Judgment normal.         Lab Results   Component Value Date     11/23/2024    K 4.7 11/23/2024     11/23/2024    CO2 28 11/23/2024    BUN 17 11/23/2024    CREATININE 0.78 11/23/2024    GLUCOSE 119 (H) 11/23/2024    CALCIUM 9.5 11/23/2024    PROT 6.7 11/22/2024    BILITOT 0.6 11/22/2024    ALKPHOS 70 11/22/2024    AST 52 (H) 11/22/2024    ALT 71 (H) 11/22/2024       Lab Results   Component Value Date    WBC 7.8 11/23/2024    HGB 15.5 11/23/2024    HCT 45.6 11/23/2024    MCV 89 11/23/2024     11/23/2024    LYMPHOPCT 21.8 11/22/2024    RBC 5.15 11/23/2024    MCH 30.1 11/23/2024    MCHC 34.0 11/23/2024    RDW 12.3 11/23/2024       No results found for: \"TSH\"  No results found for: \"LACTATE\", \"TROPONINI\", \"BNP\", \"DDIMER\", \"INR\"    IMAGES:  No results " found for this or any previous visit (from the past 4464 hours).     No echocardiogram results found for the past 12 months  === 02/17/14 ===    - Impression -  1: No evidence of fracture or dislocation.    2: Degenerative changes, as described above.      This study was interpreted and dictated at [facility] in [place], [place]  === 11/22/24 ===    CT LUMBAR SPINE WO IV CONTRAST    - Impression -  1. No acute fracture identified.  2. Degenerative changes, as described above.    MACRO:  None.    Signed by: Massimo Rojas 11/22/2024 2:41 PM  Dictation workstation:   [id number]  === 02/17/14 ===    - Impression -  1: No evidence of fracture or dislocation.    2: Degenerative changes, as described above.      This study was interpreted and dictated at [facility] in [place], [place]  === 11/22/24 ===    MR LUMBAR SPINE WO CONTRAST    - Impression -  1. Degenerative changes of the lumbar spine as described above with  left-sided foraminal protrusions at L2-3 and L3-4 resulting in severe  left L2-3 and moderate left L3-4 foraminal stenosis. Mild spinal  canal stenosis from L2-3 to L4-5. Multilevel bilateral lateral recess  stenosis as above.      Signed by: Sheila Finn 11/22/2024 8:32 PM  Dictation workstation:   [id number]    Additional results of the last 24 hours have been reviewed.    Dictated using [employer] Version 2.4  Proof read however unrecognized voice recognition errors may have occurred     Electronically signed by Erwin Machado DO on 11/24/24 at 11:53 AM

## 2024-11-24 NOTE — PROGRESS NOTES
Physical Therapy    Physical Therapy Treatment    Patient Name: Abiola Bailey  MRN: [id number]  Department: [address]  Room: [address] [place], [place] [address]  Today's Date: 11/24/2024  Time Calculation  Start Time: 0955  Stop Time: 1020  Time Calculation (min): 25 min         Assessment/Plan   PT Assessment  PT Assessment Results: Pain, Decreased mobility  Rehab Prognosis: Fair  End of Session Communication: Bedside nurse  Assessment Comment: Recomend Mod to High intensity skilled PT for back managementd/education to decrease pain, improve transfers and gait and pbalance and decrease falls risk and increase independence. Currently bedbound  End of Session Patient Position: Bed, 3 rail up, Alarm on     PT Plan  Treatment/Interventions: Bed mobility, Transfer training, Gait training, Therapeutic activity, Therapeutic exercise  PT Plan: Ongoing PT  PT Frequency: 3 times per week  PT Discharge Recommendations: Other (comment) (Pt did not require physical assist with mobility completd, although was limited in mobility activity due to pain. At this time discharge recommendation is MOD/HIGH secondary to pt mobility being limited by pain.)  Equipment Recommended upon Discharge: Wheeled walker  PT Recommended Transfer Status: Assist x2  PT - OK to Discharge: Yes      General Visit Information:   PT  Visit  PT Received On: 11/24/24  General  Reason for Referral: Pt admitted with severe back pain with trouble ambulating  Referred By: Catrina Anthony MD  Past Medical History Relevant to Rehab: chronic back pain, herniated disc, spinal stenosis, left-sided sciatica, hypertension, hiatal hernia, migraine, mixed hyperlipidemia, mitral valve disorder, complex tear of medial meniscus hepatic cyst, renal cyst, thyroid nodule, asthma and uterine fibroids  Family/Caregiver Present: No  Prior to Session Communication: Bedside nurse  Patient Position Received: Bed, 3 rail up, Alarm on  General Comment: AXOX3, verbalizes  anxiety and decline of sitting EOB or  "attempting standing secondary to c/o pain LLE and fear of falling, \"but anything you can teach me in bed I would welcome\" stated patient. (goals of sitting EOB for meals & standing NWB/WBAT with FWW with nursing starting tomorrow were accepted by p.t. at end of tx & instructed to RN.  \"now that you gave me some stretching, exerdcises, bed mobiity  that i know I can now I will\".)    Subjective   Precautions:  Precautions  Medical Precautions: Fall precautions, Spinal precautions    Vital Signs (Past 2hrs)                 Objective   Pain:  Pain Assessment  Pain Assessment:  (resting supine \"1-2/10, WB LLE 10/10\" stated patient)  Pain Interventions: Cold applied, Heat applied, Repositioned, Ambulation/increased activity (RN notifed of pain)  Response to Interventions: Decrease in pain  Cognition:  Cognition  Overall Cognitive Status: Within Functional Limits  Orientation Level: Oriented X4  Coordination:     Postural Control:     Extremity/Trunk Assessments:    Activity Tolerance:     Treatments:  Therapeutic Exercise  Therapeutic Exercise Performed: Yes  Therapeutic Exercise Activity 1: supiine ankel pumps, quad sets, heel slides x10 reps each and bridging x5 reps each for strengtheng and motor control LE's         Therapeutic Activity  Therapeutic Activity Performed: Yes  Therapeutic Activity 1: supine LE stretching: LE rotation R/L x15 seconds x2 each with CGA ,hamsrtring stretch R/L x15 seconds x2 each with Mod assist, piriformis stretch x2 R/L x15 seconds each with Max A x1. VC alaso.                   Bed Mobility  Bed Mobility: Yes  Bed Mobility 1  Bed Mobility 1: Supine to sitting, Rolling right, Rolling left, Log roll  Level of Assistance 1: Contact guard  Bed Mobility Comments 1: VC for safety and technique  Bed Mobility 2  Bed Mobility  2: Sitting to supine  Level of Assistance 2: Minimum assistance    Ambulation/Gait Training  Ambulation/Gait Training Performed: No (declined)  Transfers  Transfer: No " (declined)                     Other Activity  Other Activity Performed: Yes  Other Activity 1: education regarding spinal precautions,    Outcome Measures:  [facility] Basic Mobility  Turning from your back to your side while in a flat bed without using bedrails: A little  Moving from lying on your back to sitting on the side of a flat bed without using bedrails: A lot  Moving to and from bed to chair (including a wheelchair): Total  Standing up from a chair using your arms (e.g. wheelchair or bedside chair): Total  To walk in hospital room: Total  Climbing 3-5 steps with railing: Total  Basic Mobility - Total Score: 9    Education Documentation  Body Mechanics, taught by Jennifer Mcguire PTA at 11/24/2024 11:47 AM.  Learner: Patient  Readiness: Acceptance  Method: Explanation  Response: Verbalizes Understanding    Precautions, taught by Jennifer Mcguire PTA at 11/24/2024 11:47 AM.  Learner: Patient  Readiness: Acceptance  Method: Explanation  Response: Verbalizes Understanding    ADL Training, taught by Jennifer Mcguire PTA at 11/24/2024 11:47 AM.  Learner: Patient  Readiness: Acceptance  Method: Explanation  Response: Verbalizes Understanding    Body Mechanics, taught by Jennifer Mcguire PTA at 11/24/2024 11:47 AM.  Learner: Patient  Readiness: Acceptance  Method: Explanation  Response: Verbalizes Understanding    Mobility Training, taught by Jennifer Mcguire PTA at 11/24/2024 11:47 AM.  Learner: Patient  Readiness: Acceptance  Method: Explanation  Response: Verbalizes Understanding    Education Comments  No comments found.        OP EDUCATION:       Encounter Problems       Encounter Problems (Active)       Mobility       STG - Patient will ambulate at least 50'x2, LRAD, distant supervision (Not Progressing)       Start:  11/23/24    Expected End:  12/07/24               PT Transfers       STG - Patient to transfer to and from sit to supine mod I (Not Progressing)       Start:  11/23/24    Expected End:  12/07/24             STG - Patient will transfer sit to and from stand with LRAD, close supervision (Not Progressing)       Start:  11/23/24    Expected End:  12/07/24               Pain - Adult

## 2024-11-25 LAB
APPEARANCE UR: CLEAR
BILIRUB UR STRIP.AUTO-MCNC: NEGATIVE MG/DL
COLOR UR: NORMAL
GLUCOSE UR STRIP.AUTO-MCNC: NORMAL MG/DL
KETONES UR STRIP.AUTO-MCNC: NEGATIVE MG/DL
LEUKOCYTE ESTERASE UR QL STRIP.AUTO: NEGATIVE
NITRITE UR QL STRIP.AUTO: NEGATIVE
PH UR STRIP.AUTO: 5.5 [PH]
PROT UR STRIP.AUTO-MCNC: NEGATIVE MG/DL
RBC # UR STRIP.AUTO: NEGATIVE /UL
SP GR UR STRIP.AUTO: 1.01
UROBILINOGEN UR STRIP.AUTO-MCNC: NORMAL MG/DL

## 2024-11-25 PROCEDURE — 2500000002 HC RX 250 W HCPCS SELF ADMINISTERED DRUGS (ALT 637 FOR MEDICARE OP, ALT 636 FOR OP/ED): Performed by: INTERNAL MEDICINE

## 2024-11-25 PROCEDURE — 81003 URINALYSIS AUTO W/O SCOPE: CPT | Performed by: INTERNAL MEDICINE

## 2024-11-25 PROCEDURE — G0378 HOSPITAL OBSERVATION PER HR: HCPCS

## 2024-11-25 PROCEDURE — 2500000001 HC RX 250 WO HCPCS SELF ADMINISTERED DRUGS (ALT 637 FOR MEDICARE OP): Performed by: INTERNAL MEDICINE

## 2024-11-25 PROCEDURE — 2500000004 HC RX 250 GENERAL PHARMACY W/ HCPCS (ALT 636 FOR OP/ED): Performed by: INTERNAL MEDICINE

## 2024-11-25 PROCEDURE — 96372 THER/PROPH/DIAG INJ SC/IM: CPT | Performed by: INTERNAL MEDICINE

## 2024-11-25 PROCEDURE — 99233 SBSQ HOSP IP/OBS HIGH 50: CPT | Performed by: PHYSICIAN ASSISTANT

## 2024-11-25 RX ORDER — ERTAPENEM 1 G/1
1 INJECTION, POWDER, LYOPHILIZED, FOR SOLUTION INTRAMUSCULAR; INTRAVENOUS EVERY 24 HOURS
Status: DISCONTINUED | OUTPATIENT
Start: 2024-11-25 | End: 2024-11-25

## 2024-11-25 ASSESSMENT — COGNITIVE AND FUNCTIONAL STATUS - GENERAL
STANDING UP FROM CHAIR USING ARMS: A LOT
MOBILITY SCORE: 15
TURNING FROM BACK TO SIDE WHILE IN FLAT BAD: A LITTLE
MOBILITY SCORE: 15
WALKING IN HOSPITAL ROOM: A LOT
PERSONAL GROOMING: A LITTLE
CLIMB 3 TO 5 STEPS WITH RAILING: A LOT
PERSONAL GROOMING: A LITTLE
WALKING IN HOSPITAL ROOM: A LOT
HELP NEEDED FOR BATHING: A LITTLE
DAILY ACTIVITIY SCORE: 17
DRESSING REGULAR UPPER BODY CLOTHING: A LITTLE
DRESSING REGULAR LOWER BODY CLOTHING: A LOT
HELP NEEDED FOR BATHING: A LITTLE
TOILETING: A LOT
DRESSING REGULAR LOWER BODY CLOTHING: A LOT
DRESSING REGULAR UPPER BODY CLOTHING: A LITTLE
TURNING FROM BACK TO SIDE WHILE IN FLAT BAD: A LITTLE
STANDING UP FROM CHAIR USING ARMS: A LOT
MOVING TO AND FROM BED TO CHAIR: A LOT
DAILY ACTIVITIY SCORE: 17
CLIMB 3 TO 5 STEPS WITH RAILING: A LOT
TOILETING: A LOT
MOVING TO AND FROM BED TO CHAIR: A LOT

## 2024-11-25 ASSESSMENT — PAIN SCALES - GENERAL
PAINLEVEL_OUTOF10: 9
PAINLEVEL_OUTOF10: 8
PAINLEVEL_OUTOF10: 4
PAINLEVEL_OUTOF10: 3

## 2024-11-25 ASSESSMENT — PAIN - FUNCTIONAL ASSESSMENT
PAIN_FUNCTIONAL_ASSESSMENT: 0-10

## 2024-11-25 ASSESSMENT — PAIN DESCRIPTION - ORIENTATION: ORIENTATION: LEFT

## 2024-11-25 ASSESSMENT — PAIN DESCRIPTION - LOCATION: LOCATION: LEG

## 2024-11-25 NOTE — ASSESSMENT & PLAN NOTE
Abiola Bailey is a 69 y.o. female with a past medical history of chronic back pain, herniated disc, spinal stenosis, left-sided sciatica, hypertension, hiatal hernia, migraine, mixed hyperlipidemia, mitral valve disorder, complex tear of medial meniscus hepatic cyst, renal cyst, thyroid nodule, asthma and uterine fibroids who was admitted to the hospital for severe back pain with trouble ambulating.      Severe back pain  -Patient has left sciatic with ambulatory dysfunction due to the pain  -She received oxycodone, Norflex, Ativan, lidocaine patch, ketorolac, gabapentin and dexamethasone in the emergency room.  -The pain finally started to improve.  Patient currently rates her pain as a 3 out of 10 and was able to take a nap on arrival to the floor after family members left  -continue pain management  -tapering course of prednisone  -Continue gabapentin and baclofen  -Give morphine as needed  -Also keep Narcan on board in case of overdose  -PT/OT eval rec mod to high intensity therapy  -Follow-up with neurosurgery as outpatient    Hypoxia  -Oxygen saturation dropped when patient napped  -This is likely in part due to the medications  -Patient may also have underlying undiagnosed sleep apnea  -She has been weaned off oxygen  -Follow-up with PCP as outpatient regarding possible sleep studies.     HTN  -Valsartan/hydrochlorothiazide  -verapamil  -controlled    Obesity/deconditioning  -continue therapy  -follow up with PCP    DVT prophy  -lovenox    Dispo: showing clinical improvement on oral medications, dc planning  D/w Dr. Machado

## 2024-11-25 NOTE — PROGRESS NOTES
11/25/24 1320   Discharge Planning   Living Arrangements Alone   Support Systems Children;Family members   Assistance Needed Alert and oriented x 3, Independent with ADL's, Normally uses no DME but over the past week she has been utilizing a walker, Room air at baseline, Drives Employed, PCP Dr. Ivette Edmonds ([facility]).   Type of Residence Private residence   Number of Stairs to Enter Residence 3   Number of Stairs Within Residence 0   Do you have animals or pets at home? Yes   Type of Animals or Pets 1 cat, Tita   Who is requesting discharge planning? Provider   Home or Post Acute Services Post acute facilities (Rehab/SNF/etc)   Type of Post Acute Facility Services Rehab;Skilled nursing   Expected Discharge Disposition Rehab   Does the patient need discharge transport arranged? Yes   RoundTrip coordination needed? Yes   Has discharge transport been arranged? No   Patient Choice   Provider Choice list and CMS website (https://medicare.gov/care-compare#search) for post-acute Quality and Resource Measure Data were provided and reviewed with: Patient   Patient / Family choosing to utilize agency / facility established prior to hospitalization No

## 2024-11-25 NOTE — PROGRESS NOTES
Abiola Bailey is a 69 y.o. female on day 0 of admission presenting with Spinal stenosis.      Subjective   Pain is improving slowly, she denies CP, SOB. She is tolerating meals, no BM yet but denies abdominal pain.       Objective     Last Recorded Vitals  /72 (BP Location: Right arm, Patient Position: Lying)   Pulse 57   Temp 36.1 °C (97 °F) (Temporal)   Resp 18   Wt 104 kg (230 lb)   SpO2 91%   Intake/Output last 3 Shifts:    Intake/Output Summary (Last 24 hours) at 11/25/2024 1158  Last data filed at 11/25/2024 1114  Gross per 24 hour   Intake 814 ml   Output 1200 ml   Net -386 ml       Admission Weight  Weight: 102 kg (225 lb) (11/22/24 1317)    Daily Weight  11/22/24 : 104 kg (230 lb)    Image Results  MR lumbar spine wo IV contrast  Narrative: Interpreted By:  Sheila Finn,   STUDY:  MRI of the lumbar spine without contrast.      INDICATION:  Signs/Symptoms:c/f disk herniation, L sided back pain, severe canal  stenosis L3-5.      COMPARISON:  Same-day CT lumbar spine study.      ACCESSION NUMBER(S):  [id number]      ORDERING CLINICIAN:  ELLIOTT MCKEON      TECHNIQUE:  Multiplanar and multi-sequence images of the lumbar spine were  obtained without intravenous contrast.      FINDINGS:  This report assumes 5 non rib-bearing lumbar vertebral bodies. The  lowest lumbar intervertebral disc will be labeled L5-S1.      There is normal alignment of the lumbar vertebra. The vertebral body  heights are maintained. Bone marrow signal intensity is within normal  limits.      Severe disc height loss at L2-3. Conus terminates at level beyond the  field of view. No prevertebral or posterior paraspinal soft tissue  signal abnormalities.      T12-L1:  No significant disc bulge. No central canal or neural  foraminal stenosis.      L1-L2:  No significant disc bulge. No central canal or neural  foraminal stenosis.      L2-L3:  Disc bulge noted asymmetric to the left with foraminal  protrusion. Mild spinal canal  stenosis. Bilateral lateral recess  stenosis. Severe left foraminal stenosis.      L3-L4:  Disc bulge noted eccentric to the right with left foraminal  protrusion resulting in mild spinal canal stenosis and bilateral  lateral recess stenosis and moderate left foraminal stenosis.. Left  facet joint degenerative changes with small effusion.      L4-L5:  Disc bulge resulting in mild spinal canal stenosis, mild  bilateral lateral recess stenosis, and mild left foraminal stenosis.      L5-S1:  No significant disc bulge. No central canal or neural  foraminal stenosis. Simple cysts in the bilateral kidneys.      Impression: 1. Degenerative changes of the lumbar spine as described above with  left-sided foraminal protrusions at L2-3 and L3-4 resulting in severe  left L2-3 and moderate left L3-4 foraminal stenosis. Mild spinal  canal stenosis from L2-3 to L4-5. Multilevel bilateral lateral recess  stenosis as above.          Signed by: Sheila Finn 11/22/2024 8:32 PM  Dictation workstation:   [id number]  CT lumbar spine wo IV contrast  Narrative: Interpreted By:  Massimo Rojas,   STUDY:  CT LUMBAR SPINE WO IV CONTRAST; 11/22/2024 2:22 pm      INDICATION:  Signs/Symptoms:hx of herniated disk, LLE pain and lower back pain,  difficulty walking.      COMPARISON:  None.      ACCESSION NUMBER(S):  [id number]      ORDERING CLINICIAN:  WALTER ARAUJO      TECHNIQUE:  Contiguous axial CT images were obtained through the lumbar spine at  2 mm slice thickness without contrast administration. The images were  then reconstructed in the coronal and sagittal planes.      FINDINGS:  OSSEOUS STRUCTURES:  There is no evidence of acute fracture identified. The vertebral  bodies are well aligned without evidence of subluxation.      Moderate disc space narrowing and small to moderate marginal  osteophytes are present at the L2-3 level. Mild-to-moderate facet  degenerative changes are seen throughout the mid to lower lumbar  spine.       At least moderate to severe central canal narrowing is seen at the  L3-4 and L4-5 levels      ASSOCIATED STRUCTURES:  Evaluation of the visualized soft tissues of the abdomen is limited  by the lack of intravenous contrast. Within this limitation, no gross  mass or lymphadenopathy is identified.      Impression: 1. No acute fracture identified.  2. Degenerative changes, as described above.      MACRO:  None.      Signed by: Massimo Rojas 11/22/2024 2:41 PM  Dictation workstation:   [id number]      Physical Exam  Physical Exam  Gen: NAD  Eyes:  EOM intact  ENT: MMM  Neck: No JVD  Respiratory: CTAB, no wheezes/rhonchi  Cardiac: RRR, no murmurs rubs or gallops  Abdomen: soft, NT, +BS  MSK: nontender over lumbar spine, pain in LLE with movements  Extremities: no edema or cyanosis  Neuro: No focal deficits, alert and oriented x 3  Psych:  appropriate mood and behavior      Assessment/Plan      Assessment & Plan  Spinal stenosis    Left sided sciatica    Abiola Bailey is a 69 y.o. female with a past medical history of chronic back pain, herniated disc, spinal stenosis, left-sided sciatica, hypertension, hiatal hernia, migraine, mixed hyperlipidemia, mitral valve disorder, complex tear of medial meniscus hepatic cyst, renal cyst, thyroid nodule, asthma and uterine fibroids who was admitted to the hospital for severe back pain with trouble ambulating.      Severe back pain  -Patient has left sciatic with ambulatory dysfunction due to the pain  -She received oxycodone, Norflex, Ativan, lidocaine patch, ketorolac, gabapentin and dexamethasone in the emergency room.  -The pain finally started to improve.  Patient currently rates her pain as a 3 out of 10 and was able to take a nap on arrival to the floor after family members left  -continue pain management  -tapering course of prednisone  -Continue gabapentin and baclofen  -Give morphine as needed  -Also keep Narcan on board in case of overdose  -PT/OT eval rec mod to high  intensity therapy  -Follow-up with neurosurgery as outpatient    Hypoxia  -Oxygen saturation dropped when patient napped  -This is likely in part due to the medications  -Patient may also have underlying undiagnosed sleep apnea  -She has been weaned off oxygen  -Follow-up with PCP as outpatient regarding possible sleep studies.     HTN  -Valsartan/hydrochlorothiazide  -verapamil  -controlled    Obesity/deconditioning  -continue therapy  -follow up with PCP    DVT prophy  -lovenox    Dispo: showing clinical improvement on oral medications, dc planning  D/w SONU BarberC

## 2024-11-25 NOTE — PROGRESS NOTES
Occupational Therapy                 Therapy Communication Note    Patient Name: Abiola Bailey  MRN: [id number]  Department: [address]  Room: 223/223-B  Today's Date: 11/25/2024     Discipline: Occupational Therapy    Missed Visit Reason: Missed Visit Reason: Patient refused (Pt reporting 9/10 pain in pelvis. Declined attempted treatment today. Nurse advised of patient report. Pt willing to consider resuming treatment tomorrow.)    Missed Time: Attempt 2061

## 2024-11-25 NOTE — CARE PLAN
Problem: Pain - Adult  Goal: Verbalizes/displays adequate comfort level or baseline comfort level  Outcome: Progressing     Problem: Safety - Adult  Goal: Free from fall injury  Outcome: Progressing     Problem: Discharge Planning  Goal: Discharge to home or other facility with appropriate resources  Outcome: Progressing     Problem: Fall/Injury  Goal: Not fall by end of shift  Outcome: Progressing  Goal: Be free from injury by end of the shift  Outcome: Progressing  Goal: Verbalize understanding of personal risk factors for fall in the hospital  Outcome: Progressing  Goal: Verbalize understanding of risk factor reduction measures to prevent injury from fall in the home  Outcome: Progressing  Goal: Use assistive devices by end of the shift  Outcome: Progressing   The patient's goals for the shift include      The clinical goals for the shift include Patient will report decreased pain throoughout shift.

## 2024-11-25 NOTE — SIGNIFICANT EVENT
Patient complained of dysuria to Dr. Machado. Will collect UA with culture. Dr Machado recs IV Invanz x 3 days in the meantime. Asked RN to send UA prior to starting antibiotics

## 2024-11-26 LAB
ANION GAP SERPL CALC-SCNC: 14 MMOL/L (ref 10–20)
BUN SERPL-MCNC: 17 MG/DL (ref 6–23)
CALCIUM SERPL-MCNC: 9.3 MG/DL (ref 8.6–10.3)
CHLORIDE SERPL-SCNC: 102 MMOL/L (ref 98–107)
CO2 SERPL-SCNC: 27 MMOL/L (ref 21–32)
CREAT SERPL-MCNC: 0.79 MG/DL (ref 0.5–1.05)
EGFRCR SERPLBLD CKD-EPI 2021: 81 ML/MIN/1.73M*2
ERYTHROCYTE [DISTWIDTH] IN BLOOD BY AUTOMATED COUNT: 12.6 % (ref 11.5–14.5)
GLUCOSE SERPL-MCNC: 92 MG/DL (ref 74–99)
HCT VFR BLD AUTO: 48 % (ref 36–46)
HGB BLD-MCNC: 16 G/DL (ref 12–16)
HOLD SPECIMEN: NORMAL
MCH RBC QN AUTO: 29.8 PG (ref 26–34)
MCHC RBC AUTO-ENTMCNC: 33.3 G/DL (ref 32–36)
MCV RBC AUTO: 89 FL (ref 80–100)
NRBC BLD-RTO: 0 /100 WBCS (ref 0–0)
PLATELET # BLD AUTO: 218 X10*3/UL (ref 150–450)
POTASSIUM SERPL-SCNC: 3.7 MMOL/L (ref 3.5–5.3)
RBC # BLD AUTO: 5.37 X10*6/UL (ref 4–5.2)
SODIUM SERPL-SCNC: 139 MMOL/L (ref 136–145)
WBC # BLD AUTO: 10.3 X10*3/UL (ref 4.4–11.3)

## 2024-11-26 PROCEDURE — 99232 SBSQ HOSP IP/OBS MODERATE 35: CPT | Performed by: INTERNAL MEDICINE

## 2024-11-26 PROCEDURE — 80048 BASIC METABOLIC PNL TOTAL CA: CPT | Performed by: PHYSICIAN ASSISTANT

## 2024-11-26 PROCEDURE — 97530 THERAPEUTIC ACTIVITIES: CPT | Mod: GP,CQ

## 2024-11-26 PROCEDURE — 2500000002 HC RX 250 W HCPCS SELF ADMINISTERED DRUGS (ALT 637 FOR MEDICARE OP, ALT 636 FOR OP/ED): Performed by: INTERNAL MEDICINE

## 2024-11-26 PROCEDURE — 97116 GAIT TRAINING THERAPY: CPT | Mod: GP,CQ

## 2024-11-26 PROCEDURE — 94760 N-INVAS EAR/PLS OXIMETRY 1: CPT

## 2024-11-26 PROCEDURE — 97535 SELF CARE MNGMENT TRAINING: CPT | Mod: GO,CO

## 2024-11-26 PROCEDURE — 96372 THER/PROPH/DIAG INJ SC/IM: CPT | Performed by: INTERNAL MEDICINE

## 2024-11-26 PROCEDURE — 36415 COLL VENOUS BLD VENIPUNCTURE: CPT | Performed by: PHYSICIAN ASSISTANT

## 2024-11-26 PROCEDURE — 2500000004 HC RX 250 GENERAL PHARMACY W/ HCPCS (ALT 636 FOR OP/ED): Performed by: INTERNAL MEDICINE

## 2024-11-26 PROCEDURE — 85027 COMPLETE CBC AUTOMATED: CPT | Performed by: PHYSICIAN ASSISTANT

## 2024-11-26 PROCEDURE — 9420000001 HC RT PATIENT EDUCATION 5 MIN

## 2024-11-26 PROCEDURE — G0378 HOSPITAL OBSERVATION PER HR: HCPCS

## 2024-11-26 PROCEDURE — 97530 THERAPEUTIC ACTIVITIES: CPT | Mod: GO,CO

## 2024-11-26 PROCEDURE — 2500000001 HC RX 250 WO HCPCS SELF ADMINISTERED DRUGS (ALT 637 FOR MEDICARE OP): Performed by: INTERNAL MEDICINE

## 2024-11-26 ASSESSMENT — COGNITIVE AND FUNCTIONAL STATUS - GENERAL
WALKING IN HOSPITAL ROOM: A LOT
DRESSING REGULAR LOWER BODY CLOTHING: A LOT
TOILETING: A LOT
MOVING TO AND FROM BED TO CHAIR: A LITTLE
MOBILITY SCORE: 11
DRESSING REGULAR UPPER BODY CLOTHING: A LITTLE
CLIMB 3 TO 5 STEPS WITH RAILING: A LITTLE
STANDING UP FROM CHAIR USING ARMS: A LOT
STANDING UP FROM CHAIR USING ARMS: A LOT
MOVING FROM LYING ON BACK TO SITTING ON SIDE OF FLAT BED WITH BEDRAILS: A LITTLE
MOBILITY SCORE: 15
DAILY ACTIVITIY SCORE: 16
DRESSING REGULAR LOWER BODY CLOTHING: A LOT
DRESSING REGULAR LOWER BODY CLOTHING: A LITTLE
STANDING UP FROM CHAIR USING ARMS: A LITTLE
HELP NEEDED FOR BATHING: A LITTLE
TURNING FROM BACK TO SIDE WHILE IN FLAT BAD: A LITTLE
WALKING IN HOSPITAL ROOM: TOTAL
DRESSING REGULAR UPPER BODY CLOTHING: A LITTLE
PERSONAL GROOMING: A LITTLE
TURNING FROM BACK TO SIDE WHILE IN FLAT BAD: A LITTLE
MOVING TO AND FROM BED TO CHAIR: A LOT
HELP NEEDED FOR BATHING: A LITTLE
TOILETING: A LITTLE
DRESSING REGULAR UPPER BODY CLOTHING: A LITTLE
WALKING IN HOSPITAL ROOM: A LITTLE
HELP NEEDED FOR BATHING: A LOT
DAILY ACTIVITIY SCORE: 17
TOILETING: A LOT
CLIMB 3 TO 5 STEPS WITH RAILING: TOTAL
MOBILITY SCORE: 19
CLIMB 3 TO 5 STEPS WITH RAILING: A LOT
TURNING FROM BACK TO SIDE WHILE IN FLAT BAD: A LITTLE
MOVING TO AND FROM BED TO CHAIR: TOTAL
DAILY ACTIVITIY SCORE: 20
PERSONAL GROOMING: A LITTLE

## 2024-11-26 ASSESSMENT — PAIN DESCRIPTION - LOCATION
LOCATION: KNEE
LOCATION: LEG
LOCATION: LEG

## 2024-11-26 ASSESSMENT — PAIN DESCRIPTION - ORIENTATION
ORIENTATION: LEFT

## 2024-11-26 ASSESSMENT — PAIN - FUNCTIONAL ASSESSMENT
PAIN_FUNCTIONAL_ASSESSMENT: UNABLE TO SELF-REPORT
PAIN_FUNCTIONAL_ASSESSMENT: 0-10

## 2024-11-26 ASSESSMENT — ACTIVITIES OF DAILY LIVING (ADL): HOME_MANAGEMENT_TIME_ENTRY: 24

## 2024-11-26 ASSESSMENT — PAIN SCALES - GENERAL
PAINLEVEL_OUTOF10: 0 - NO PAIN
PAINLEVEL_OUTOF10: 8
PAINLEVEL_OUTOF10: 6
PAINLEVEL_OUTOF10: 6
PAINLEVEL_OUTOF10: 5 - MODERATE PAIN
PAINLEVEL_OUTOF10: 8

## 2024-11-26 NOTE — PROGRESS NOTES
Abiola Bailey is a 69 y.o. female on day 0 of admission presenting with Spinal stenosis.      Subjective   Mrs. Bailey was seen and evaluated. She denied fever and chills. No nausea or vomiting.  She noted that her pain is better controlled.     Objective     Last Recorded Vitals  /78   Pulse 68   Temp 36.2 °C (97.2 °F) (Temporal)   Resp 16   Wt 104 kg (230 lb)   SpO2 93%   Intake/Output last 3 Shifts:    Intake/Output Summary (Last 24 hours) at 11/26/2024 1801  Last data filed at 11/26/2024 1340  Gross per 24 hour   Intake 360 ml   Output 2400 ml   Net -2040 ml       Admission Weight  Weight: 102 kg (225 lb) (11/22/24 1317)    Daily Weight  11/22/24 : 104 kg (230 lb)    Image Results  MR lumbar spine wo IV contrast  Narrative: Interpreted By:  Sheila Finn,   STUDY:  MRI of the lumbar spine without contrast.      INDICATION:  Signs/Symptoms:c/f disk herniation, L sided back pain, severe canal  stenosis L3-5.      COMPARISON:  Same-day CT lumbar spine study.      ACCESSION NUMBER(S):  [id number]      ORDERING CLINICIAN:  ELLIOTT MCKEON      TECHNIQUE:  Multiplanar and multi-sequence images of the lumbar spine were  obtained without intravenous contrast.      FINDINGS:  This report assumes 5 non rib-bearing lumbar vertebral bodies. The  lowest lumbar intervertebral disc will be labeled L5-S1.      There is normal alignment of the lumbar vertebra. The vertebral body  heights are maintained. Bone marrow signal intensity is within normal  limits.      Severe disc height loss at L2-3. Conus terminates at level beyond the  field of view. No prevertebral or posterior paraspinal soft tissue  signal abnormalities.      T12-L1:  No significant disc bulge. No central canal or neural  foraminal stenosis.      L1-L2:  No significant disc bulge. No central canal or neural  foraminal stenosis.      L2-L3:  Disc bulge noted asymmetric to the left with foraminal  protrusion. Mild spinal canal stenosis. Bilateral  lateral recess  stenosis. Severe left foraminal stenosis.      L3-L4:  Disc bulge noted eccentric to the right with left foraminal  protrusion resulting in mild spinal canal stenosis and bilateral  lateral recess stenosis and moderate left foraminal stenosis.. Left  facet joint degenerative changes with small effusion.      L4-L5:  Disc bulge resulting in mild spinal canal stenosis, mild  bilateral lateral recess stenosis, and mild left foraminal stenosis.      L5-S1:  No significant disc bulge. No central canal or neural  foraminal stenosis. Simple cysts in the bilateral kidneys.      Impression: 1. Degenerative changes of the lumbar spine as described above with  left-sided foraminal protrusions at L2-3 and L3-4 resulting in severe  left L2-3 and moderate left L3-4 foraminal stenosis. Mild spinal  canal stenosis from L2-3 to L4-5. Multilevel bilateral lateral recess  stenosis as above.          Signed by: Sheila Finn 11/22/2024 8:32 PM  Dictation workstation:   [id number]  CT lumbar spine wo IV contrast  Narrative: Interpreted By:  Massimo Rojas,   STUDY:  CT LUMBAR SPINE WO IV CONTRAST; 11/22/2024 2:22 pm      INDICATION:  Signs/Symptoms:hx of herniated disk, LLE pain and lower back pain,  difficulty walking.      COMPARISON:  None.      ACCESSION NUMBER(S):  [id number]      ORDERING CLINICIAN:  WALTER ARAUJO      TECHNIQUE:  Contiguous axial CT images were obtained through the lumbar spine at  2 mm slice thickness without contrast administration. The images were  then reconstructed in the coronal and sagittal planes.      FINDINGS:  OSSEOUS STRUCTURES:  There is no evidence of acute fracture identified. The vertebral  bodies are well aligned without evidence of subluxation.      Moderate disc space narrowing and small to moderate marginal  osteophytes are present at the L2-3 level. Mild-to-moderate facet  degenerative changes are seen throughout the mid to lower lumbar  spine.      At least moderate  to severe central canal narrowing is seen at the  L3-4 and L4-5 levels      ASSOCIATED STRUCTURES:  Evaluation of the visualized soft tissues of the abdomen is limited  by the lack of intravenous contrast. Within this limitation, no gross  mass or lymphadenopathy is identified.      Impression: 1. No acute fracture identified.  2. Degenerative changes, as described above.      MACRO:  None.      Signed by: Massimo Rojas 11/22/2024 2:41 PM  Dictation workstation:   [id number]      Physical Exam  Constitutional:       Appearance: Normal appearance.   HENT:      Head: Normocephalic.      Nose: Nose normal.      Mouth/Throat:      Mouth: Mucous membranes are moist.   Eyes:      Pupils: Pupils are equal, round, and reactive to light.   Cardiovascular:      Rate and Rhythm: Normal rate and regular rhythm.   Pulmonary:      Effort: Pulmonary effort is normal.      Breath sounds: Normal breath sounds.   Abdominal:      General: Bowel sounds are normal.      Palpations: Abdomen is soft.   Musculoskeletal:         General: Normal range of motion.   Skin:     General: Skin is warm.   Neurological:      General: No focal deficit present.      Mental Status: She is alert and oriented to person, place, and time.   Psychiatric:         Mood and Affect: Mood normal.         Relevant Results               Assessment/Plan        Assessment & Plan  Spinal stenosis    Left sided sciatica    Severe back pain  -Follow-up with neurosurgery as outpatient  -Pain is better controlled on current medication  -PT/OT  -Discharge to SNF if approved    Hypoxia  -resolved. Continue incentive spirometry    HTN: BP is better controlled. Continue current home medds    Obesity/deconditioning: PT/OT    DVT prophy  -lovenox      Mariano Muse MD

## 2024-11-26 NOTE — PROGRESS NOTES
Physical Therapy    Physical Therapy Treatment    Patient Name: Abiola Bailey  MRN: [id number]  Department: [address]  Room: [address] [place], [place] [address]  Today's Date: 11/26/2024  Time Calculation  Start Time: 1030  Stop Time: 1055  Time Calculation (min): 25 min         Assessment/Plan   PT Assessment  PT Assessment Results: Pain, Decreased mobility  Rehab Prognosis: Fair  Barriers to Participation:  (pain)  End of Session Communication: Bedside nurse  Assessment Comment: Recomend Mod to High intensity skilled PT for back managementd/education to decrease pain, improve transfers and gait and balance and decrease falls risk and increase independence.  End of Session Patient Position: Bed, 3 rail up, Alarm on     PT Plan  Treatment/Interventions: Bed mobility, Transfer training, Gait training, Therapeutic activity, Therapeutic exercise  PT Plan: Ongoing PT  PT Frequency: 3 times per week  PT Discharge Recommendations: Other (comment) (Pt did not require physical assist with mobility completd, although was limited in mobility activity due to pain. At this time discharge recommendation is MOD/HIGH secondary to pt mobility being limited by pain.)  Equipment Recommended upon Discharge: Wheeled walker  PT Recommended Transfer Status: Assist x2  PT - OK to Discharge: Yes      General Visit Information:   PT  Visit  PT Received On: 11/26/24  Response to Previous Treatment: Partial compliance with home exercise program (had increased c/o right knee pain and pelvic so did not comply with sitting EOB and standing NWB/WBAT LLE with FWw and nursing.)  General  Reason for Referral: Pt admitted with severe back pain with trouble ambulating  Referred By: Catrina Anthony MD  Past Medical History Relevant to Rehab: chronic back pain, herniated disc, spinal stenosis, left-sided sciatica, hypertension, hiatal hernia, migraine, mixed hyperlipidemia, mitral valve disorder, complex tear of medial meniscus hepatic cyst, renal cyst, thyroid nodule, asthma  "and uterine fibroids  Family/Caregiver Present: No  Prior to Session Communication: Bedside nurse  Patient Position Received: Bed, 3 rail up, Alarm on  General Comment: AXOX3,patient states \"I have been doing those exercises, the first day my knee hurt really bad after them but its been better\" \"im consitpated and pee constantly small amounts \"nursing notified. \" patient was on bedpan, dependent for personal care, voided slightly. Right knee with cold pack no swelling, redness, skin intact. (Briefly tolerated transfers and standing NWB LLE would abruptly sit secondary to increased pain left knee, supine eases the pain, Nursing notified, cold pack placed to left knee.)    Subjective   Precautions:  Precautions  Medical Precautions: Fall precautions, Spinal precautions    Vital Signs (Past 2hrs)                 Objective   Pain:  Pain Assessment  Pain Assessment:  (left knee sup and inf areas 2/10 supine 9/10 sitting or standing NWB LLE)  Cognition:  Cognition  Orientation Level: Oriented X4  Coordination:     Postural Control:  Static Sitting Balance  Static Sitting-Balance Support: Bilateral upper extremity supported, Feet unsupported  Static Sitting-Level of Assistance: Close supervision  Static Sitting-Comment/Number of Minutes: 45seconds x1 and then 25 seconds x1  Static Standing Balance  Static Standing-Balance Support: Bilateral upper extremity supported  Static Standing-Level of Assistance: Contact guard, Close supervision  Static Standing-Comment/Number of Minutes: 1 minute  Extremity/Trunk Assessments:    Activity Tolerance:  Activity Tolerance  Endurance: Tolerates 30 min exercise with multiple rests  Treatments:  Therapeutic Exercise  Therapeutic Exercise Performed: Yes  Therapeutic Exercise Activity 1: supine ankle pumps, quad sets, heel slides x10 reps each and bridging x5 reps. therex for strengthening, trunk support and ROM.         Therapeutic Activity  Therapeutic Activity Performed: Yes (Low back/LE " stretches: supine lumbar rotation, hamstring and piriformis stretch x1 x10 seconds each, pateitn demoing, to do x2 reps x3 x a day with 20 to 30 second holds. HEP and handouts given.)                   Bed Mobility  Bed Mobility: Yes  Bed Mobility 1  Bed Mobility 1: Supine to sitting, Rolling right, Rolling left, Log roll  Level of Assistance 1: Contact guard, Close supervision, Minimal tactile cues, Minimal verbal cues  Bed Mobility 2  Bed Mobility  2: Sitting to supine  Level of Assistance 2: Minimum assistance, Minimal verbal cues, Minimal tactile cues    Ambulation/Gait Training  Ambulation/Gait Training Performed: Yes  Ambulation/Gait Training 1  Surface 1: Level tile  Device 1: Rolling walker  Assistance 1: Contact guard  Quality of Gait 1:  (pivoted on RLE while NWB on LLE along side bed)  Transfers  Transfer: Yes (declined)          Object From Floor  Devices: Rolling walker  Assist Level: Minimum assist  Comments: VC for safety and technique, demo given           Other Activity  Other Activity Performed: Yes (education regarding HEP, back precatuions, goals of therapy)    Outcome Measures:  [facility] Basic Mobility  Turning from your back to your side while in a flat bed without using bedrails: A little  Moving from lying on your back to sitting on the side of a flat bed without using bedrails: A little  Moving to and from bed to chair (including a wheelchair): Total  Standing up from a chair using your arms (e.g. wheelchair or bedside chair): A lot  To walk in hospital room: Total  Climbing 3-5 steps with railing: Total  Basic Mobility - Total Score: 11    Education Documentation  Body Mechanics, taught by Jennifer Mcguire PTA at 11/26/2024 12:02 PM.  Learner: Patient  Readiness: Acceptance  Method: Explanation  Response: Verbalizes Understanding    Precautions, taught by Jennifer Mcguire PTA at 11/26/2024 12:02 PM.  Learner: Patient  Readiness: Acceptance  Method: Explanation  Response: Verbalizes  Understanding    ADL Training, taught by Jennifer Mcguire PTA at 11/26/2024 12:02 PM.  Learner: Patient  Readiness: Acceptance  Method: Explanation  Response: Verbalizes Understanding    Body Mechanics, taught by Jennifer Mcguire PTA at 11/26/2024 12:02 PM.  Learner: Patient  Readiness: Acceptance  Method: Explanation  Response: Verbalizes Understanding    Mobility Training, taught by Jennifer Mcguire PTA at 11/26/2024 12:02 PM.  Learner: Patient  Readiness: Acceptance  Method: Explanation  Response: Verbalizes Understanding    Education Comments  No comments found.        OP EDUCATION:       Encounter Problems       Encounter Problems (Active)       Mobility       STG - Patient will ambulate at least 50'x2, LRAD, distant supervision (Not Progressing)       Start:  11/23/24    Expected End:  12/07/24               PT Transfers       STG - Patient to transfer to and from sit to supine mod I (Progressing)       Start:  11/23/24    Expected End:  12/07/24            STG - Patient will transfer sit to and from stand with LRAD, close supervision (Progressing)       Start:  11/23/24    Expected End:  12/07/24               Pain - Adult

## 2024-11-26 NOTE — PROGRESS NOTES
Occupational Therapy    OT Treatment    Patient Name: Abiola Bailey  MRN: [id number]  Department: [facility]  [facility]  Today's Date: 11/26/2024  Time Calculation  Start Time: 1327  Stop Time: 1401  Time Calculation (min): 34 min        Assessment:  OT Assessment: Pt initially hesitant howeever actively participating and progressing with established POC.  Will continue to address remaining deficits with skilled OT intrervention  Prognosis: Good  Barriers to Discharge: None  Evaluation/Treatment Tolerance: Patient limited by pain  Medical Staff Made Aware: Yes  End of Session Communication: Bedside nurse  End of Session Patient Position: Up in chair (alarm off per NSG in agreement.  Call light in reach Pt demonstrate use all needs met)  OT Assessment Results: Decreased ADL status, Decreased endurance, Decreased functional mobility  Prognosis: Good  Barriers to Discharge: None  Evaluation/Treatment Tolerance: Patient limited by pain  Medical Staff Made Aware: Yes  Strengths: Ability to acquire knowledge, Premorbid level of function  Barriers to Participation: Other (Comment) (pain)  Plan:  Treatment Interventions: ADL retraining, Functional transfer training, Endurance training, Patient/family training, Equipment evaluation/education, Compensatory technique education  OT Frequency: 3 times per week  OT Discharge Recommendations: Moderate intensity level of continued care  Equipment Recommended upon Discharge: Wheeled walker  OT Recommended Transfer Status: Assist of 1  OT - OK to Discharge: Yes  Treatment Interventions: ADL retraining, Functional transfer training, Endurance training, Patient/family training, Equipment evaluation/education, Compensatory technique education    Subjective   Previous Visit Info:  OT Last Visit  OT Received On: 11/26/24  General:  General  Reason for Referral: Pt admitted with severe back pain with trouble ambulating  Referred By: Catrina Anthony MD  Past Medical History Relevant  to Rehab: chronic back pain, herniated disc, spinal stenosis, left-sided sciatica, hypertension, hiatal hernia, migraine, mixed hyperlipidemia, mitral valve disorder, complex tear of medial meniscus hepatic cyst, renal cyst, thyroid nodule, asthma and uterine fibroids  Prior to Session Communication: Bedside nurse  Patient Position Received: Bed, 3 rail up, Alarm on  Preferred Learning Style: verbal, auditory  General Comment: Pt cleared by NSG and is agreeable to skilled OT intervention supine upon entrance to room  Precautions:  Medical Precautions: Fall precautions, Spinal precautions  Precautions Comment: Edcucated Pt wtih and demonstrated application accurately [id number] precautions    Vital Signs (Past 2hrs)        Date/Time Vitals Session Patient Position Pulse Resp SpO2 BP MAP (mmHg)    11/26/24 1340 --  --  68  16  93 %  123/78  --                   Pain:  Pain Assessment  Pain Assessment: 0-10  0-10 (Numeric) Pain Score: 0 - No pain  Pain Type: Acute pain  Pain Location: Back  Pain Orientation: Lower  Pain Interventions: Repositioned, Environmental changes, Relaxation technique, Therapeutic presence (Discussed with NSG.)  Response to Interventions: Increase in pain    Objective    Cognition:  Cognition  Overall Cognitive Status: Within Functional Limits  Orientation Level: Oriented X4  Coordination:  Movements are Fluid and Coordinated: Yes  Activities of Daily Living:      LE Dressing  LE Dressing: Yes  LE Dressing Adaptive Equipment: Reacher, Sock aide  Sock Level of Assistance: Close supervision  LE Dressing Where Assessed: Edge of bed  LE Dressing Comments: Pt doff/don socks LH reacher/sock aid self setup to device    Toileting  Toileting Level of Assistance: Moderate assistance  Where Assessed: Bedside commode (at bedside)  Toileting Comments: Pt adjusting gown prior asssit hygiene after BM in stance BUE support RW  Functional Standing Tolerance:  Time: 2 minutes  Activity: ADL  skills/transfer  Functional Standing Tolerance Comments: BUE support RW vc posture and deep breathing for discomfort adaptation  Bed Mobility/Transfers: Bed Mobility  Bed Mobility: Yes  Bed Mobility 1  Bed Mobility 1: Supine to sitting, Sitting to supine, Rolling right, Rolling left  Level of Assistance 1: Close supervision  Bed Mobility Comments 1: log roll instructed with bed to neutral use side transfer bar effortful following spinal precautions    Transfers  Transfer: Yes  Transfer 1  Transfer From 1: Bed to  Transfer to 1:  (HOB)  Technique 1: Sit to stand  Transfer Level of Assistance 1: Close supervision  Trials/Comments 1: Pt seated side scoot to HOB Rigt  Transfers 2  Transfer From 2: Bed to  Transfer to 2: Stand  Technique 2: Sit to stand, Stand to sit  Transfer Device 2: Walker, Gait belt (rollimng)  Transfer Level of Assistance 2: Moderate assistance  Trials/Comments 2: vc hand placement body mechanics and to exhale upon exertion effortful  Transfers 3  Transfer From 3:  (bedside commode at bedside)  Transfer to 3: Chair with arms (recliner)  Technique 3: Sit to stand, Stand to sit, To right  Transfer Device 3: Walker, Gait belt (rolling)  Transfer Level of Assistance 3: Moderate assistance, Moderate verbal cues  Trials/Comments 3: vc hand placement and controlled descent    Toilet Transfers  Toilet Transfer From: Bed  Toilet Transfer Type: To and from  Toilet Transfer to: Standard bedside commode  Toilet Transfer Technique: To left, To right, Ambulating  Toilet Transfers: Moderate assistance  Toilet Transfers Comments: gait belt use simple direct vc hand placemetn and controlled descent effortful  Sitting Balance:  Dynamic Sitting Balance  Dynamic Sitting-Balance Support: Feet supported  Dynamic Sitting-Level of Assistance: Distant supervision  Dynamic Sitting-Balance: Forward lean, Reaching for objects  Dynamic Sitting-Comments: edge of bed with ADL skills, seated 10 minutes  Therapy/Activity:     Therapeutic Activity  Therapeutic Activity Performed: Yes  Therapeutic Activity 1: application deep breathing to positional changes to exhale upon exertion,  Outcome Measures:[facility] Daily Activity  Putting on and taking off regular lower body clothing: A lot  Bathing (including washing, rinsing, drying): A lot  Putting on and taking off regular upper body clothing: A little  Toileting, which includes using toilet, bedpan or urinal: A lot  Taking care of personal grooming such as brushing teeth: A little  Eating Meals: None  Daily Activity - Total Score: 16    Education Documentation  Body Mechanics, taught by AQUILINO Murray at 11/26/2024  2:21 PM.  Learner: Patient  Readiness: Acceptance  Method: Explanation, Demonstration, Teach-back  Response: Verbalizes Understanding, Demonstrated Understanding  Comment: Instructed Pt with appliction spinal precautions to safe transfefrs, balance strategies, body mechancis, and adaptive ADL skills    Precautions, taught by AQUILINO Murray at 11/26/2024  2:21 PM.  Learner: Patient  Readiness: Acceptance  Method: Explanation, Demonstration, Teach-back  Response: Verbalizes Understanding, Demonstrated Understanding  Comment: Instructed Pt with appliction spinal precautions to safe transfefrs, balance strategies, body mechancis, and adaptive ADL skills    ADL Training, taught by AQUILINO Murray at 11/26/2024  2:21 PM.  Learner: Patient  Readiness: Acceptance  Method: Explanation, Demonstration, Teach-back  Response: Verbalizes Understanding, Demonstrated Understanding  Comment: Instructed Pt with appliction spinal precautions to safe transfefrs, balance strategies, body mechancis, and adaptive ADL skills    Education Comments  No comments found.        OP EDUCATION:       Goals:  Encounter Problems       Encounter Problems (Active)       OT Goals       Pt will increase static/dynamic sitting to Good to increase safety and independence with functional task completion.    (Progressing)       Start:  11/23/24    Expected End:  12/07/24            Pt will increase endurance to tolerate 15min of EOB activity with no more than 1 rest break in order to increase ability to engage in ADL completion.  (Progressing)       Start:  11/23/24    Expected End:  12/07/24            Pt will tolerate 10min stand during functional task completion with no more than 1 rest break in order to increase endurance for functional task completion.  (Progressing)       Start:  11/23/24    Expected End:  12/07/24            Pt will demo LE ADL completion with modified independence, using AE if needed.  (Progressing)       Start:  11/23/24    Expected End:  12/07/24            Pt will complete [id number] transfers using LRD in preparation for ADLs with supervision  (Progressing)       Start:  11/23/24    Expected End:  12/07/24

## 2024-11-26 NOTE — CARE PLAN
The patient's goals for the shift include   Problem: Pain - Adult  Goal: Verbalizes/displays adequate comfort level or baseline comfort level  Outcome: Progressing     Problem: Safety - Adult  Goal: Free from fall injury  Outcome: Progressing     Problem: Discharge Planning  Goal: Discharge to home or other facility with appropriate resources  Outcome: Progressing     Problem: Chronic Conditions and Co-morbidities  Goal: Patient's chronic conditions and co-morbidity symptoms are monitored and maintained or improved  Outcome: Progressing     Problem: Fall/Injury  Goal: Not fall by end of shift  Outcome: Progressing  Goal: Be free from injury by end of the shift  Outcome: Progressing  Goal: Verbalize understanding of personal risk factors for fall in the hospital  Outcome: Progressing  Goal: Verbalize understanding of risk factor reduction measures to prevent injury from fall in the home  Outcome: Progressing  Goal: Use assistive devices by end of the shift  Outcome: Progressing     Problem: Skin  Goal: Participates in plan/prevention/treatment measures  Outcome: Progressing  Goal: Prevent/manage excess moisture  Outcome: Progressing  Goal: Prevent/minimize sheer/friction injuries  Outcome: Progressing  Goal: Promote/optimize nutrition  Outcome: Progressing     Problem: Pain  Goal: Takes deep breaths with improved pain control throughout the shift  Outcome: Progressing  Goal: Turns in bed with improved pain control throughout the shift  Outcome: Progressing  Goal: Walks with improved pain control throughout the shift  Outcome: Progressing  Goal: Performs ADL's with improved pain control throughout shift  Outcome: Progressing  Goal: Participates in PT with improved pain control throughout the shift  Outcome: Progressing  Goal: Free from opioid side effects throughout the shift  Outcome: Progressing  Goal: Free from acute confusion related to pain meds throughout the shift  Outcome: Progressing     The clinical goals  for the shift include Patient will have pain well controlled throughout this shift.

## 2024-11-26 NOTE — ASSESSMENT & PLAN NOTE
Severe back pain  -Follow-up with neurosurgery as outpatient  -Pain is better controlled on current medication  -PT/OT  -Discharge to SNF if approved    Hypoxia  -resolved. Continue incentive spirometry    HTN: BP is better controlled. Continue current home medds    Obesity/deconditioning: PT/OT    DVT prophy  -lovenox

## 2024-11-27 VITALS
DIASTOLIC BLOOD PRESSURE: 68 MMHG | BODY MASS INDEX: 40.75 KG/M2 | RESPIRATION RATE: 18 BRPM | SYSTOLIC BLOOD PRESSURE: 107 MMHG | OXYGEN SATURATION: 90 % | HEIGHT: 63 IN | HEART RATE: 61 BPM | TEMPERATURE: 97.7 F | WEIGHT: 230 LBS

## 2024-11-27 PROBLEM — Z78.9 IMPAIRED MOBILITY AND ADLS: Status: ACTIVE | Noted: 2024-11-27

## 2024-11-27 PROBLEM — Z74.09 IMPAIRED MOBILITY AND ADLS: Status: ACTIVE | Noted: 2024-11-27

## 2024-11-27 PROCEDURE — 99239 HOSP IP/OBS DSCHRG MGMT >30: CPT | Performed by: INTERNAL MEDICINE

## 2024-11-27 PROCEDURE — G0378 HOSPITAL OBSERVATION PER HR: HCPCS

## 2024-11-27 PROCEDURE — 2500000001 HC RX 250 WO HCPCS SELF ADMINISTERED DRUGS (ALT 637 FOR MEDICARE OP): Performed by: INTERNAL MEDICINE

## 2024-11-27 PROCEDURE — 97530 THERAPEUTIC ACTIVITIES: CPT | Mod: GO,CO,59

## 2024-11-27 PROCEDURE — 2500000002 HC RX 250 W HCPCS SELF ADMINISTERED DRUGS (ALT 637 FOR MEDICARE OP, ALT 636 FOR OP/ED): Performed by: INTERNAL MEDICINE

## 2024-11-27 PROCEDURE — 96372 THER/PROPH/DIAG INJ SC/IM: CPT | Performed by: INTERNAL MEDICINE

## 2024-11-27 PROCEDURE — 2500000004 HC RX 250 GENERAL PHARMACY W/ HCPCS (ALT 636 FOR OP/ED): Performed by: INTERNAL MEDICINE

## 2024-11-27 PROCEDURE — 97535 SELF CARE MNGMENT TRAINING: CPT | Mod: GO,CO

## 2024-11-27 RX ORDER — AMOXICILLIN 250 MG
2 CAPSULE ORAL 2 TIMES DAILY
Start: 2024-11-27

## 2024-11-27 RX ORDER — POLYETHYLENE GLYCOL 3350 17 G/17G
17 POWDER, FOR SOLUTION ORAL DAILY
Start: 2024-11-27

## 2024-11-27 RX ORDER — OXYCODONE HYDROCHLORIDE 5 MG/1
5 TABLET ORAL EVERY 4 HOURS PRN
Qty: 15 TABLET | Refills: 0 | Status: SHIPPED | OUTPATIENT
Start: 2024-11-27

## 2024-11-27 RX ORDER — PANTOPRAZOLE SODIUM 40 MG/1
40 TABLET, DELAYED RELEASE ORAL
Start: 2024-11-27

## 2024-11-27 RX ORDER — PREDNISONE 5 MG/1
TABLET ORAL
Start: 2024-11-28 | End: 2024-12-05

## 2024-11-27 ASSESSMENT — COGNITIVE AND FUNCTIONAL STATUS - GENERAL
PERSONAL GROOMING: A LITTLE
STANDING UP FROM CHAIR USING ARMS: A LITTLE
TURNING FROM BACK TO SIDE WHILE IN FLAT BAD: A LITTLE
MOVING TO AND FROM BED TO CHAIR: A LITTLE
DAILY ACTIVITIY SCORE: 18
TOILETING: A LITTLE
DRESSING REGULAR LOWER BODY CLOTHING: A LITTLE
DRESSING REGULAR LOWER BODY CLOTHING: A LITTLE
DRESSING REGULAR UPPER BODY CLOTHING: A LITTLE
HELP NEEDED FOR BATHING: A LITTLE
WALKING IN HOSPITAL ROOM: A LITTLE
HELP NEEDED FOR BATHING: A LITTLE
TOILETING: A LOT
CLIMB 3 TO 5 STEPS WITH RAILING: A LITTLE
DAILY ACTIVITIY SCORE: 19
PERSONAL GROOMING: A LITTLE
DRESSING REGULAR UPPER BODY CLOTHING: A LITTLE
MOBILITY SCORE: 19

## 2024-11-27 ASSESSMENT — PAIN - FUNCTIONAL ASSESSMENT
PAIN_FUNCTIONAL_ASSESSMENT: 0-10
PAIN_FUNCTIONAL_ASSESSMENT: 0-10

## 2024-11-27 ASSESSMENT — ACTIVITIES OF DAILY LIVING (ADL): HOME_MANAGEMENT_TIME_ENTRY: 18

## 2024-11-27 ASSESSMENT — PAIN SCALES - GENERAL
PAINLEVEL_OUTOF10: 8

## 2024-11-27 ASSESSMENT — PAIN DESCRIPTION - LOCATION: LOCATION: BACK

## 2024-11-27 NOTE — DISCHARGE SUMMARY
Discharge Diagnosis  Spinal stenosis    Issues Requiring Follow-Up  Acute rehab    Discharge Meds     Medication List      START taking these medications     oxyCODONE 5 mg immediate release tablet; Commonly known as: Roxicodone;   Take 1 tablet (5 mg) by mouth every 4 hours if needed for severe pain (7 -   10).   pantoprazole 40 mg EC tablet; Commonly known as: ProtoNix; Take 1 tablet   (40 mg) by mouth 2 times a day before meals. Do not crush, chew, or split.   polyethylene glycol 17 gram packet; Commonly known as: Glycolax,   Miralax; Take 17 g by mouth once daily.   predniSONE 5 mg tablet; Commonly known as: Deltasone; Take 3 tablets (15   mg) by mouth once daily for 1 day, THEN 2 tablets (10 mg) once daily for 3   days, THEN 1 tablet (5 mg) once daily for 3 days.; Start taking on:   November 28, 2024   sennosides-docusate sodium 8.6-50 mg tablet; Commonly known as:   Ani-Colace; Take 2 tablets by mouth 2 times a day.     CONTINUE taking these medications     acetaminophen 325 mg tablet; Commonly known as: Tylenol   Advair HFA 45-21 mcg/actuation inhaler; Generic drug: fluticasone   propion-salmeteroL   albuterol 90 mcg/actuation inhaler   baclofen 10 mg tablet; Commonly known as: Lioresal   cholecalciferol 25 MCG (1000 UT) capsule; Commonly known as: Vitamin D-3   gabapentin 300 mg capsule; Commonly known as: Neurontin   meloxicam 15 mg tablet; Commonly known as: Mobic   multivitamin tablet   sertraline 100 mg tablet; Commonly known as: Zoloft   valsartan-hydrochlorothiazide 80-12.5 mg tablet; Commonly known as:   Diovan-HCT   verapamil  mg 24 hr capsule; Commonly known as: Veralan PM     STOP taking these medications     hydroCHLOROthiazide 25 mg tablet; Commonly known as: HYDRODiuril   ibuprofen 400 mg tablet   ketorolac 10 mg tablet; Commonly known as: Toradol       Test Results Pending At Discharge  Pending Labs       No current pending labs.            Hospital Course  Mrs. Jackson presented to the  hospital with severe back pain. She was in severe pain on admission. She was also having difficulty with ambulation. She was placed on tapering dose of pain medication. She was also placed on baclofen and gabapentin. PT/OT was consulted. Patient's pain improved slightly.   Pre-Auth was obtained and patient was discharged to Acute Rehab.     She was hypoxic during hospitalization. She was briefly placed on oxygen. However, she was wean off.   Patient was discharged to Acute rehab in stable condition.     Time Spent in discharge of patient is greater than 30 minutes.    Pertinent Physical Exam At Time of Discharge  Physical Exam  Constitutional:       Appearance: Normal appearance.   HENT:      Head: Normocephalic and atraumatic.      Nose: Nose normal.      Mouth/Throat:      Mouth: Mucous membranes are moist.   Eyes:      Extraocular Movements: Extraocular movements intact.      Pupils: Pupils are equal, round, and reactive to light.   Cardiovascular:      Rate and Rhythm: Normal rate and regular rhythm.   Pulmonary:      Effort: Pulmonary effort is normal.      Breath sounds: Normal breath sounds.   Abdominal:      General: Bowel sounds are normal.      Palpations: Abdomen is soft.   Skin:     General: Skin is warm.   Neurological:      Mental Status: She is alert and oriented to person, place, and time.   Psychiatric:         Mood and Affect: Mood normal.         Outpatient Follow-Up  No future appointments.      Mariano Muse MD

## 2024-11-27 NOTE — PROGRESS NOTES
Occupational Therapy    Occupational Therapy Treatment    Name: Abiola Bailey  MRN: [id number]  Department: [facility] S OBS  Room: 223/223-B  Date: 11/27/24  Time Calculation  Start Time: 0932  Stop Time: 1000  Time Calculation (min): 28 min    Assessment:  OT Assessment: Pt demonstrated good response to skilled OT intervention in accord with OT POC. Continues to be limited for ADL and mobility by back pain, though is progressing well. Continue to recommend skilled OT intervention.  Prognosis: Good  Barriers to Discharge: None  Evaluation/Treatment Tolerance: Patient tolerated treatment well, Patient limited by pain  Medical Staff Made Aware: Yes  End of Session Communication: Bedside nurse (Discussed pain management, assist needed in transfers and selfcare tasks.)  End of Session Patient Position: Up in chair (Arm off with nurse approval, call bell in easy reach.)  Plan:  Treatment Interventions: ADL retraining, Functional transfer training, Endurance training, Patient/family training, Equipment evaluation/education, Compensatory technique education  OT Frequency: 3 times per week  OT Discharge Recommendations: Moderate intensity level of continued care (Continue to recommend MOD level of intensity in discharge setting despite high AMAPC score d/t patient remaining a high falls risk and unable to safely resume IADL/ADL at prior independent level of function in home environment with established supports.)  Equipment Recommended upon Discharge: Wheeled walker  OT Recommended Transfer Status: Assist of 1  OT - OK to Discharge: Yes (In accord with OT POC)    Subjective   Previous Visit Info:  OT Last Visit  OT Received On: 11/27/24  General:  General  Reason for Referral: Pt admitted with severe back pain with trouble ambulating, referred to OT for impaired ADLa and related mobility.  Referred By: Catrina Anthony MD  Past Medical History Relevant to Rehab: chronic back pain, herniated disc, spinal stenosis, left-sided  "sciatica, hypertension, hiatal hernia, migraine, mixed hyperlipidemia, mitral valve disorder, complex tear of medial meniscus hepatic cyst, renal cyst, thyroid nodule, asthma and uterine fibroids  Prior to Session Communication: Bedside nurse (No new limitations per nurse, pt appropriate for treatment.)  Patient Position Received: Bed, 3 rail up, Alarm on  Preferred Learning Style: verbal, auditory  General Comment: Pt pleasant and cooperative, reveiewed back protection strategies and adaptive strategies for selfcare.  Precautions:  Medical Precautions: Fall precautions, Spinal precautions  Precautions Comment: Pt able to verbally recall back/spine precautions using \"BLT\" and demonstrate effective log roll and back neutral bed mobility and transfer techniques.    Vital Signs (Past 2hrs)        Date/Time Vitals Session Patient Position Pulse Resp SpO2 BP MAP (mmHg)    11/27/24 0915 --  --  61  18  95 %  110/74  86                        Pain Assessment:  Pain Assessment  Pain Assessment: 0-10  0-10 (Numeric) Pain Score: 8  Pain Type: Acute pain, Chronic pain  Clinical Progression: Rapidly improving  Effect of Pain on Daily Activities: LB dressing task difficult with sitting at EOB in part d/t pelvic tilt and exacerbaion of spasm activity.  Pain Interventions:  (Nurse medicated patient in course of treatment including muscle relaxant. Pt provided with cold to lumbar spine and hip. Reported significant releif with positioning in chair with legs elevated at end of session.)  Response to Interventions: Relief, Resting quietly, Decrease in pain     Objective   Cognition:  Overall Cognitive Status: Within Functional Limits  Orientation Level: Oriented X4  Activities of Daily Living: LE Dressing  LE Dressing: Yes  LE Dressing Adaptive Equipment: Reacher, Sock aide  Sock Level of Assistance: Contact guard, Setup  LE Dressing Where Assessed: Edge of bed  LE Dressing Comments: Required mod verbal cues for effective technique " and cues for neutral spine to avoid extension with leg lift on left in task d/t back spasm and pain exacerbation.    Functional Standing Tolerance:  Functional Standing Tolerance  Time: 2 minutes  Activity: ADL skills/transfer  Functional Standing Tolerance Comments: BUE support RW vc posture and deep breathing for discomfort adaptation  Bed Mobility/Transfers: Bed Mobility  Bed Mobility: Yes  Bed Mobility 1  Bed Mobility 1: Supine to sitting, Rolling right  Level of Assistance 1: Contact guard, Minimal verbal cues  Bed Mobility Comments 1: good recall of log roll technique with neutral spine using bed rail.    Transfers  Transfer: Yes  Transfer 1  Transfer From 1: Bed to  Transfer to 1: Stand  Technique 1: Sit to stand, Stand to sit  Transfer Device 1:  (front wheel walker)  Transfer Level of Assistance 1: Contact guard  Trials/Comments 1: (x2)  Transfers 2  Transfer From 2: Stand to  Transfer to 2: Chair with arms  Technique 2: Stand pivot  Transfer Device 2: Walker (rolling walker - Min cues for technique and device management in close quarters for neutral spine.)  Transfer Level of Assistance 2: Minimum assistance, Minimal verbal cues  Trials/Comments 2: Pt required cues for relaxed breathing and for safe transition hands to/from device.     Therapy/Activity: Therapeutic Activity  Therapeutic Activity Performed: Yes  Therapeutic Activity 1: reveiwed back protection strategies, normalization of breathing and symptom management including use of thermal agents (heat and cold) and positioning to address pain/spasms. Pt receptive and responsive to instruction, demonstrated good grasp of education.    Outcome Measures:  [facility] Daily Activity  Putting on and taking off regular lower body clothing: A little  Bathing (including washing, rinsing, drying): A little  Putting on and taking off regular upper body clothing: A little  Toileting, which includes using toilet, bedpan or urinal: A lot  Taking care of personal  grooming such as brushing teeth: A little  Eating Meals: None  Daily Activity - Total Score: 18    Education Documentation  Body Mechanics, taught by AQUILINO Kennedy at 11/27/2024 10:32 AM.  Learner: Patient  Readiness: Eager  Method: Explanation, Demonstration  Response: Verbalizes Understanding, Demonstrated Understanding, Needs Reinforcement  Comment: Pt educated on use of back protection, symptom management and adaptive techniques and strategies for safety in ADL and related mobility. Pt demonstrates good understanding, though continues to demonstrate need for reinforcement of same.    Precautions, taught by AQUILINO Kennedy at 11/27/2024 10:32 AM.  Learner: Patient  Readiness: Eager  Method: Explanation, Demonstration  Response: Verbalizes Understanding, Demonstrated Understanding, Needs Reinforcement  Comment: Pt educated on use of back protection, symptom management and adaptive techniques and strategies for safety in ADL and related mobility. Pt demonstrates good understanding, though continues to demonstrate need for reinforcement of same.    ADL Training, taught by AQUILINO Kennedy at 11/27/2024 10:32 AM.  Learner: Patient  Readiness: Eager  Method: Explanation, Demonstration  Response: Verbalizes Understanding, Demonstrated Understanding, Needs Reinforcement  Comment: Pt educated on use of back protection, symptom management and adaptive techniques and strategies for safety in ADL and related mobility. Pt demonstrates good understanding, though continues to demonstrate need for reinforcement of same.    Education Comments  Patient receptive and compliant to education provided in course of session.    Goals:  Encounter Problems       Encounter Problems (Active)       OT Goals       Pt will increase static/dynamic sitting to Good to increase safety and independence with functional task completion.   (Progressing)       Start:  11/23/24    Expected End:  12/07/24            Pt will increase endurance  to tolerate 15min of EOB activity with no more than 1 rest break in order to increase ability to engage in ADL completion.  (Progressing)       Start:  11/23/24    Expected End:  12/07/24            Pt will tolerate 10min stand during functional task completion with no more than 1 rest break in order to increase endurance for functional task completion.  (Progressing)       Start:  11/23/24    Expected End:  12/07/24            Pt will demo LE ADL completion with modified independence, using AE if needed.  (Progressing)       Start:  11/23/24    Expected End:  12/07/24            Pt will complete [id number] transfers using LRD in preparation for ADLs with supervision  (Progressing)       Start:  11/23/24    Expected End:  12/07/24

## 2024-11-27 NOTE — PROGRESS NOTES
11/27/24 1311   Discharge Planning   Living Arrangements Alone   Support Systems Children;Family members   Assistance Needed Alert and oriented x 3, Independent with ADL's, Normally uses no DME but over the past week she has been utilizing a walker, Room air at baseline, Drives Employed, PCP Dr. Ivette Edmonds ([facility]). Patient is a RN   Type of Residence Private residence   Number of Stairs to Enter Residence 3   Number of Stairs Within Residence 0   Do you have animals or pets at home? Yes   Type of Animals or Pets 1 cat, Tita   Who is requesting discharge planning? Provider   Home or Post Acute Services Post acute facilities (Rehab/SNF/etc)   Type of Post Acute Facility Services Rehab   Expected Discharge Disposition Rehab  (DIscharge today 11/27/24 to [facility] in [place])   Does the patient need discharge transport arranged? Yes   RoundTrip coordination needed? Yes   Has discharge transport been arranged? No   Patient Choice   Provider Choice list and CMS website (https://medicare.gov/care-compare#search) for post-acute Quality and Resource Measure Data were provided and reviewed with: Family;Patient   Patient / Family choosing to utilize agency / facility established prior to hospitalization No   Intensity of Service   Intensity of Service 0-30 min